# Patient Record
Sex: FEMALE | Race: WHITE | Employment: FULL TIME | URBAN - METROPOLITAN AREA
[De-identification: names, ages, dates, MRNs, and addresses within clinical notes are randomized per-mention and may not be internally consistent; named-entity substitution may affect disease eponyms.]

---

## 2018-08-14 PROBLEM — R80.9 MICROALBUMINURIA: Status: ACTIVE | Noted: 2018-08-14

## 2019-08-21 ENCOUNTER — HOSPITAL ENCOUNTER (EMERGENCY)
Age: 49
Discharge: HOME | End: 2019-08-21
Attending: EMERGENCY MEDICINE
Payer: MEDICARE

## 2019-08-21 VITALS
OXYGEN SATURATION: 97 % | HEART RATE: 89 BPM | WEIGHT: 265 LBS | HEIGHT: 70 IN | BODY MASS INDEX: 37.94 KG/M2 | TEMPERATURE: 97.9 F | SYSTOLIC BLOOD PRESSURE: 137 MMHG | DIASTOLIC BLOOD PRESSURE: 76 MMHG | RESPIRATION RATE: 18 BRPM

## 2019-08-21 DIAGNOSIS — R73.9 HYPERGLYCEMIA: Primary | ICD-10-CM

## 2019-08-21 DIAGNOSIS — R53.83 OTHER FATIGUE: ICD-10-CM

## 2019-08-21 LAB
-: ABNORMAL
ABSOLUTE EOS #: 0.1 K/UL (ref 0–0.4)
ABSOLUTE IMMATURE GRANULOCYTE: ABNORMAL K/UL (ref 0–0.3)
ABSOLUTE LYMPH #: 1.9 K/UL (ref 1–4.8)
ABSOLUTE MONO #: 0.35 K/UL (ref 0.1–1.3)
ALBUMIN SERPL-MCNC: 4.4 G/DL (ref 3.5–5.2)
ALBUMIN/GLOBULIN RATIO: ABNORMAL (ref 1–2.5)
ALP BLD-CCNC: 122 U/L (ref 35–104)
ALT SERPL-CCNC: 37 U/L (ref 5–33)
AMORPHOUS: ABNORMAL
ANION GAP SERPL CALCULATED.3IONS-SCNC: 16 MMOL/L (ref 9–17)
AST SERPL-CCNC: 43 U/L
BACTERIA: ABNORMAL
BASOPHILS # BLD: 1 % (ref 0–2)
BASOPHILS ABSOLUTE: 0.05 K/UL (ref 0–0.2)
BETA-HYDROXYBUTYRATE: 0.17 MMOL/L (ref 0.02–0.27)
BILIRUB SERPL-MCNC: 0.39 MG/DL (ref 0.3–1.2)
BILIRUBIN URINE: NEGATIVE
BUN BLDV-MCNC: 14 MG/DL (ref 6–20)
BUN/CREAT BLD: ABNORMAL (ref 9–20)
CALCIUM SERPL-MCNC: 9.6 MG/DL (ref 8.6–10.4)
CASTS UA: ABNORMAL /LPF
CASTS UA: ABNORMAL /LPF
CHLORIDE BLD-SCNC: 96 MMOL/L (ref 98–107)
CO2: 22 MMOL/L (ref 20–31)
COLOR: YELLOW
COMMENT UA: ABNORMAL
CREAT SERPL-MCNC: 0.55 MG/DL (ref 0.5–0.9)
CRYSTALS, UA: ABNORMAL /HPF
DIFFERENTIAL TYPE: ABNORMAL
EOSINOPHILS RELATIVE PERCENT: 2 % (ref 0–4)
EPITHELIAL CELLS UA: ABNORMAL /HPF
GFR AFRICAN AMERICAN: >60 ML/MIN
GFR NON-AFRICAN AMERICAN: >60 ML/MIN
GFR SERPL CREATININE-BSD FRML MDRD: ABNORMAL ML/MIN/{1.73_M2}
GFR SERPL CREATININE-BSD FRML MDRD: ABNORMAL ML/MIN/{1.73_M2}
GLUCOSE BLD-MCNC: 276 MG/DL (ref 70–99)
GLUCOSE URINE: ABNORMAL
HCT VFR BLD CALC: 37 % (ref 36–46)
HEMOGLOBIN: 11.8 G/DL (ref 12–16)
IMMATURE GRANULOCYTES: ABNORMAL %
KETONES, URINE: NEGATIVE
LEUKOCYTE ESTERASE, URINE: NEGATIVE
LYMPHOCYTES # BLD: 38 % (ref 24–44)
MCH RBC QN AUTO: 25.9 PG (ref 26–34)
MCHC RBC AUTO-ENTMCNC: 31.9 G/DL (ref 31–37)
MCV RBC AUTO: 81.3 FL (ref 80–100)
MONOCYTES # BLD: 7 % (ref 1–7)
MORPHOLOGY: ABNORMAL
MUCUS: ABNORMAL
NITRITE, URINE: NEGATIVE
NRBC AUTOMATED: ABNORMAL PER 100 WBC
OTHER OBSERVATIONS UA: ABNORMAL
PDW BLD-RTO: 16.4 % (ref 11.5–14.9)
PH UA: 5 (ref 5–8)
PLATELET # BLD: 210 K/UL (ref 150–450)
PLATELET ESTIMATE: ABNORMAL
PMV BLD AUTO: 8.3 FL (ref 6–12)
POTASSIUM SERPL-SCNC: 4.3 MMOL/L (ref 3.7–5.3)
PROTEIN UA: ABNORMAL
RBC # BLD: 4.55 M/UL (ref 4–5.2)
RBC # BLD: ABNORMAL 10*6/UL
RBC UA: ABNORMAL /HPF
RENAL EPITHELIAL, UA: ABNORMAL /HPF
SEG NEUTROPHILS: 52 % (ref 36–66)
SEGMENTED NEUTROPHILS ABSOLUTE COUNT: 2.6 K/UL (ref 1.3–9.1)
SODIUM BLD-SCNC: 134 MMOL/L (ref 135–144)
SPECIFIC GRAVITY UA: 1.02 (ref 1–1.03)
TOTAL PROTEIN: 8.3 G/DL (ref 6.4–8.3)
TRICHOMONAS: ABNORMAL
TSH SERPL DL<=0.05 MIU/L-ACNC: 1.67 MIU/L (ref 0.3–5)
TURBIDITY: ABNORMAL
URINE HGB: ABNORMAL
UROBILINOGEN, URINE: NORMAL
WBC # BLD: 5 K/UL (ref 3.5–11)
WBC # BLD: ABNORMAL 10*3/UL
WBC UA: ABNORMAL /HPF
YEAST: ABNORMAL

## 2019-08-21 PROCEDURE — 80053 COMPREHEN METABOLIC PANEL: CPT

## 2019-08-21 PROCEDURE — 84443 ASSAY THYROID STIM HORMONE: CPT

## 2019-08-21 PROCEDURE — 85025 COMPLETE CBC W/AUTO DIFF WBC: CPT

## 2019-08-21 PROCEDURE — 99283 EMERGENCY DEPT VISIT LOW MDM: CPT

## 2019-08-21 PROCEDURE — 82010 KETONE BODYS QUAN: CPT

## 2019-08-21 PROCEDURE — 81001 URINALYSIS AUTO W/SCOPE: CPT

## 2019-08-21 NOTE — ED PROVIDER NOTES
97.9 °F (36.6 °C) (Oral)   Resp 18   Ht 5' 10\" (1.778 m)   Wt 265 lb (120.2 kg)   LMP 07/20/2019   SpO2 97%   BMI 38.02 kg/m²     General Appearance: Well-appearing, in no acute distress  HEENT: Head: normocephalic/atraumatic eyes: PERRLA, EOMT, conjunctiva not injected, sclerae nonicteric ears: External canals patent nose: Nares patent, no rhinorrhea, throat:mucous membranes moist, oropharynx clear     Neck: Trachea midline, no JVD. Lungs: No evidence of increased work of breathing. CTA B/L, no wheezes/rhonchi     Cardiovascular: RRR, no murmur, 2+ peripheral pulses bilaterally. Cap refill less than 2 seconds. No lower extremity edema noted    Abdomen: Soft, nontender. No guarding or rebound tenderness. Neurologic: BRYANT  to person, place, time, and event. No sensation deficits. Moving all extremities    Extremities: Skin warm, dry and intact. DIFFERENTIAL  DIAGNOSIS     PLAN (LABS / IMAGING / EKG):  Orders Placed This Encounter   Procedures    CBC Auto Differential    Comprehensive Metabolic Panel    Urinalysis with Microscopic    Beta Hydroxybutyrate    TSH w/reflex to FT4    Inpatient consult to Primary Care Provider       MEDICATIONS ORDERED:  No orders of the defined types were placed in this encounter.       DIAGNOSTIC RESULTS / EMERGENCY DEPARTMENT COURSE / MDM     LABS:  Results for orders placed or performed during the hospital encounter of 08/21/19   CBC Auto Differential   Result Value Ref Range    WBC 5.0 3.5 - 11.0 k/uL    RBC 4.55 4.0 - 5.2 m/uL    Hemoglobin 11.8 (L) 12.0 - 16.0 g/dL    Hematocrit 37.0 36 - 46 %    MCV 81.3 80 - 100 fL    MCH 25.9 (L) 26 - 34 pg    MCHC 31.9 31 - 37 g/dL    RDW 16.4 (H) 11.5 - 14.9 %    Platelets 521 966 - 615 k/uL    MPV 8.3 6.0 - 12.0 fL    NRBC Automated NOT REPORTED per 100 WBC    Differential Type NOT REPORTED     Immature Granulocytes NOT REPORTED 0 %    Absolute Immature Granulocyte NOT REPORTED 0.00 - 0.30 k/uL    WBC Morphology None    Mucus, UA NOT REPORTED None    Trichomonas, UA NOT REPORTED None    Amorphous, UA NOT REPORTED None    Other Observations UA NOT REPORTED NOT REQ. Yeast, UA NOT REPORTED None   TSH w/reflex to FT4   Result Value Ref Range    TSH 1.67 0.30 - 5.00 mIU/L     RADIOLOGY:  No results found. EKG  None    All EKG's are interpreted by the Emergency Department Physician who either signs or Co-signs this chart in the absence of a cardiologist.    EMERGENCY DEPARTMENT COURSE/IMPRESSION:   Patient sent from primary care office for hyperglycemia to rule out DKA and for chronic fatigue. Will get basic labs beta hydroxybutyrate TSH as well as urinalysis. Will call primary care office with results and instructions for further care. Hyperglycemic but no evidence of DKA without ketones and urinating without decreased bicarbonate. Spoke with primary care physician's office covering call who did not have any specific recommendations as per patient follow-up with PCP and possibly endocrinology. PROCEDURES:  None    CONSULTS:  IP CONSULT TO PRIMARY CARE PROVIDER    CRITICAL CARE:  None    FINAL IMPRESSION      1. Hyperglycemia    2.  Other fatigue          DISPOSITION / PLAN     DISPOSITION        PATIENT REFERRED TO:  Knenedy Mendoza MD  [facility] 1995  [phone]            DISCHARGE MEDICATIONS:  New Prescriptions    No medications on file       Ashlee Pino MD  Emergency Medicine Resident    (Please note that portions of this note were completed with a voice recognition program.  Efforts were made to edit the dictations but occasionally words aremis-transcribed.)       Ashlee Pino MD  Resident  08/21/19 [id number]

## 2020-01-07 PROBLEM — K05.219 PERIODONTAL ABSCESS: Status: ACTIVE | Noted: 2017-10-03

## 2020-07-16 RX ORDER — PEN NEEDLE, DIABETIC 32GX 5/32"
NEEDLE, DISPOSABLE MISCELLANEOUS
Qty: 100 EACH | Refills: 0 | OUTPATIENT
Start: 2020-07-16

## 2022-10-31 ENCOUNTER — HOSPITAL ENCOUNTER (OUTPATIENT)
Age: 52
End: 2022-10-31
Attending: SURGERY
Payer: MEDICARE

## 2022-10-31 ENCOUNTER — HOSPITAL ENCOUNTER (OUTPATIENT)
Age: 52
Setting detail: SPECIMEN
Discharge: HOME | End: 2022-10-31
Payer: MEDICARE

## 2022-10-31 ENCOUNTER — HOSPITAL ENCOUNTER (OUTPATIENT)
Age: 52
Discharge: HOME | End: 2022-10-31
Attending: SURGERY
Payer: MEDICARE

## 2022-10-31 ENCOUNTER — HOSPITAL ENCOUNTER (OUTPATIENT)
Dept: PREADMISSION TESTING | Age: 52
Discharge: HOME | End: 2022-11-04
Attending: SURGERY | Admitting: SURGERY
Payer: MEDICARE

## 2022-10-31 VITALS
OXYGEN SATURATION: 99 % | TEMPERATURE: 98.3 F | DIASTOLIC BLOOD PRESSURE: 83 MMHG | HEIGHT: 70 IN | SYSTOLIC BLOOD PRESSURE: 149 MMHG | BODY MASS INDEX: 35.36 KG/M2 | WEIGHT: 247 LBS | HEART RATE: 85 BPM | RESPIRATION RATE: 16 BRPM

## 2022-10-31 LAB
ABO/RH: NORMAL
ABSOLUTE EOS #: 0 K/UL (ref 0–0.4)
ABSOLUTE LYMPH #: 2.4 K/UL (ref 1–4.8)
ABSOLUTE MONO #: 0.4 K/UL (ref 0.1–1.3)
ALBUMIN SERPL-MCNC: 4.4 G/DL (ref 3.5–5.2)
ALP BLD-CCNC: 180 U/L (ref 35–104)
ALT SERPL-CCNC: 17 U/L (ref 5–33)
ANION GAP SERPL CALCULATED.3IONS-SCNC: 11 MMOL/L (ref 9–17)
ANTIBODY SCREEN: NEGATIVE
ARM BAND NUMBER: NORMAL
AST SERPL-CCNC: 20 U/L
BASOPHILS # BLD: 1 % (ref 0–2)
BASOPHILS ABSOLUTE: 0 K/UL (ref 0–0.2)
BILIRUB SERPL-MCNC: 0.3 MG/DL (ref 0.3–1.2)
BLOOD BANK COMMENT: NORMAL
BUN BLDV-MCNC: 11 MG/DL (ref 6–20)
CALCIUM SERPL-MCNC: 9.9 MG/DL (ref 8.6–10.4)
CHLORIDE BLD-SCNC: 101 MMOL/L (ref 98–107)
CO2: 27 MMOL/L (ref 20–31)
CREAT SERPL-MCNC: 0.49 MG/DL (ref 0.5–0.9)
EOSINOPHILS RELATIVE PERCENT: 1 % (ref 0–4)
EXPIRATION DATE: NORMAL
GFR SERPL CREATININE-BSD FRML MDRD: >60 ML/MIN/1.73M2
GLUCOSE BLD-MCNC: 119 MG/DL (ref 70–99)
HCT VFR BLD CALC: 33.6 % (ref 36–46)
HEMOGLOBIN: 10.9 G/DL (ref 12–16)
LYMPHOCYTES # BLD: 34 % (ref 24–44)
MCH RBC QN AUTO: 26.3 PG (ref 26–34)
MCHC RBC AUTO-ENTMCNC: 32.4 G/DL (ref 31–37)
MCV RBC AUTO: 81.1 FL (ref 80–100)
MONOCYTES # BLD: 6 % (ref 1–7)
PDW BLD-RTO: 15.4 % (ref 11.5–14.9)
PLATELET # BLD: 267 K/UL (ref 150–450)
PMV BLD AUTO: 7.7 FL (ref 6–12)
POTASSIUM SERPL-SCNC: 4.9 MMOL/L (ref 3.7–5.3)
RBC # BLD: 4.14 M/UL (ref 4–5.2)
SEG NEUTROPHILS: 58 % (ref 36–66)
SEGMENTED NEUTROPHILS ABSOLUTE COUNT: 4.1 K/UL (ref 1.3–9.1)
SODIUM BLD-SCNC: 139 MMOL/L (ref 135–144)
TOTAL PROTEIN: 7.7 G/DL (ref 6.4–8.3)
WBC # BLD: 7 K/UL (ref 3.5–11)

## 2022-10-31 PROCEDURE — 85025 COMPLETE CBC W/AUTO DIFF WBC: CPT

## 2022-10-31 PROCEDURE — APPSS45 APP SPLIT SHARED TIME 31-45 MINUTES: Performed by: NURSE PRACTITIONER

## 2022-10-31 PROCEDURE — 83036 HEMOGLOBIN GLYCOSYLATED A1C: CPT

## 2022-10-31 PROCEDURE — 86901 BLOOD TYPING SEROLOGIC RH(D): CPT

## 2022-10-31 PROCEDURE — 86850 RBC ANTIBODY SCREEN: CPT

## 2022-10-31 PROCEDURE — 86900 BLOOD TYPING SEROLOGIC ABO: CPT

## 2022-10-31 PROCEDURE — 80053 COMPREHEN METABOLIC PANEL: CPT

## 2022-10-31 PROCEDURE — 36415 COLL VENOUS BLD VENIPUNCTURE: CPT

## 2022-10-31 NOTE — DISCHARGE INSTRUCTIONS
Pre-op Instructions For Patient Being Admitted After Surgery    Medication Instructions:  Please stop herbs and any supplements now (includes vitamins and minerals). Please contact your surgeon and prescribing physician for pre-op instructions for any blood thinners. If you have inhalers/aerosol treatments at home, please use them the morning of your surgery and bring the inhalers with you to the hospital.    Please take the following medications the morning of your surgery with a sip of water:    Metoprolol    Surgery Instructions:  After midnight before surgery:  Do not eat or drink anything, including water, mints, gum, and hard candy. You may brush your teeth without swallowing. No smoking, chewing tobacco, or street drugs. Please shower or bathe before surgery. If you were given Surgical Scrub Chlorhexidine Gluconate Liquid (CHG), please shower the night before and the morning of your surgery following the detailed instructions you received during your pre-admission visit. Please do not wear any cologne, lotion, powder, deodorant, jewelry, piercings, perfume, makeup, nail polish, hair accessories, or hair spray on the day of surgery. Wear loose comfortable clothing. Leave your valuables at home. Bring a storage case for any glasses/contacts. The Day of Surgery:  Arrive at [address] [place] [address] Surgery Entrance at the time directed by your surgeon and check in at the desk. If you have a living will or healthcare power of , please bring a copy. You will be taken to the pre-op holding area where you will be prepared for surgery. A physical assessment will be performed by a nurse practitioner or house officer. Your IV will be started and you will meet your anesthesiologist.    When you go to surgery, your family will be directed to the surgical waiting room, where the doctor should speak with them after your surgery.     You will be in the recovery room after surgery and taken to your room when you are stable. Please leave your suitcase in the car. Have your family member take it to your room after you are out of recovery. If you use a Bi-PAP or C-PAP machine, please bring it with you and leave it in the car until you are in your room.

## 2022-10-31 NOTE — H&P (VIEW-ONLY)
HISTORY and [facility] [address]       NAME:  Korin Luong  MRN: [id number]   YOB: 1970   Date: 10/31/2022   Age: 54 y.o. Gender: female       COMPLAINT AND PRESENT HISTORY:     Korin Luong is 54 y.o. , female, Preadmission Testing for SIGMOID DIVERTICULOSIS, MALIGNANT APPEARING MASS. She will be scheduled to have, OPEN SIGMOID COLECTOMY. Patient has hx of sigmoid diverticulitis, confirmed by CT scan on 8/8/22  She had flare up of abdominal pain lasting for months. The pain was  reported in her lower abdomen with some associated diarrhea. Approx 4 mos ago. Also pain worst with eating. She was eating bland diet on most part. Sporadic nausea and vomiting. Patient reports aggravating pain that would increase to 8/10 often. Patient reports that she has present pain in mid lower abdomen, she describes like \"menstrual cramp x 10\" she states when she eat something , she will have pain approx 15 min afterward. Pt states that she generally has the urge to defecate, but sometimes don\"t. She admits to more diarrhea with mucous and watery stools. , she denies any hematochezia or melena stools. Patient reports having loss of appetite and has had some weight loss. Her last Colonoscopy was 10/6/22 at [facility] . This was her first Colonoscopy   reports that found to have multiple large polyps. Patient reports discovering incidental finding in her CT report revealing Cirrhotic morphology of liver,  Splenomegaly and  Cholelithiasis. Patient is adopted and her FH is unknown. Patient denies any nausea / vomiting. No constipation. No heartburn or dysphagia. no changes in the color of the stools.      Any significant  medical conditions:   DM, MVP, HTN, anemia    Associated medications: Glucophage, glimepiride, Lantus 84 U BID, Novolog 12U TID,  Lopressor, Hyzaar,   Lipitor  Any anticoagulants or blood thinners: No    Hemoglobin A1C   Date Value Ref Range Status   08/30/2022 10.6 % Final     BP Readings from Last 3 Encounters:   10/31/22 (!) 149/83   10/20/22 136/82   08/30/22 114/70     Pt denies any current chest pain, SOB. No recent URI, fever or chills. Functional Capacity per patient:              1. Patient is able to walk 2 city blocks on level ground without SOB. 2. Patient is able to climb 2 flights of stairs without SOB. Patient denies any personal or family problems with anesthesia. IMAGING  CT ABDOMEN AND PELVIS W CONT  10/10/22    IMPRESSION:   1. Bowel wall thickening and inflammatory changes in the sigmoid colon consistent with focal inflammatory bowel disease. This could be infectious in etiology. However, underlying neoplasm not excluded. 2.  Cirrhotic morphology of liver without focal hepatic lesion. Splenomegaly. 3.  Cholelithiasis without cholecystitis. Lab Results   Component Value Date/Time    [place] [id number] 10/31/2022 01:45 PM         PAST MEDICAL HISTORY     Past Medical History:   Diagnosis Date    Anemia     low iron    Cataract     Depression     Detached retina 2001    Right eye. No vision upper half. Diabetes mellitus due to underlying condition, uncontrolled, with diabetic neuropathy, without long-term current use of insulin 06/21/2016    Diverticulitis     Hyperlipidemia     Hypertension     Microalbuminuria 08/14/2018    MVP (mitral valve prolapse)     Obesity (BMI 35.0-39.9 without comorbidity) 07/01/2016    Partial old retinal detachment     Shingles 09/2016    right neck, no residual    Type II or unspecified type diabetes mellitus without mention of complication, not stated as uncontrolled        SURGICAL HISTORY       Past Surgical History:   Procedure Laterality Date    COLONOSCOPY W/ BIOPSIES  10/06/2022    WITH POLYPS; done at \[facility]\"" Right 01/01/1983    ankle    LIPOMA RESECTION Left 01/01/2009    LUQ       FAMILY HISTORY       Family History   Adopted:  Yes SOCIAL HISTORY       Social History     Socioeconomic History    Marital status: Single     Spouse name: None    Number of children: None    Years of education: None    Highest education level: None   Occupational History    Occupation:      Comment: Night shift   Tobacco Use    Smoking status: Never    Smokeless tobacco: Never   Substance and Sexual Activity    Alcohol use: No     Alcohol/week: 0.0 standard drinks    Drug use: No     Social Determinants of Health     Financial Resource Strain: Medium Risk    Difficulty of Paying Living Expenses: Somewhat hard   Food Insecurity: No Food Insecurity    Worried About Running Out of Food in the Last Year: Never true    Ran Out of Food in the Last Year: Never true       REVIEW OF SYSTEMS      No Known Allergies    Current Outpatient Medications on File Prior to Encounter   Medication Sig Dispense Refill    insulin glargine (LANTUS SOLOSTAR) 100 UNIT/ML injection pen INJECT 82 UNITS SUBCUTANEOUSLY 2 TIMES A DAY (Patient taking differently: INJECT 84 UNITS SUBCUTANEOUSLY 2 TIMES A DAY) 45 mL 0    atorvastatin (LIPITOR) 20 MG tablet TAKE 1 TABLET BY MOUTH EVERY DAY 30 tablet 0    sertraline (ZOLOFT) 100 MG tablet Take 1 tablet by mouth daily 90 tablet 0    metoprolol tartrate (LOPRESSOR) 25 MG tablet Take 0.5 tablets by mouth 2 times daily 30 tablet 1    metFORMIN (GLUCOPHAGE) 1000 MG tablet TAKE 1 TABLET BY MOUTH TWO TIMES A DAY WITH MEALS 60 tablet 0    rOPINIRole (REQUIP) 0.5 MG tablet Take one tab PO nightly 30 min before bed. 30 tablet 2    nystatin (MYCOSTATIN) 505479 UNIT/GM powder Apply topically 4 times daily. (Patient taking differently: as needed Apply topically 4 times daily. ) 15 g 2    losartan-hydroCHLOROthiazide (HYZAAR) 100-12.5 MG per tablet TAKE 1 TABLET BY MOUTH EVERY DAY (Patient taking differently: at bedtime TAKE 1 TABLET BY MOUTH EVERY DAY) 30 tablet 0    insulin aspart (NOVOLOG FLEXPEN) 100 UNIT/ML injection pen INJECT 12 UNITS SUBCUTANEOUSLY THREE TIMES A DAY BEFORE MEALS 15 mL 0    Lancets (ONETOUCH DELICA PLUS [id number]) MISC Test Blood sugar up to 4 times daily, as needed 150 each 5    Insulin Pen Needle (BD PEN NEEDLE EDWIN 2ND GEN) 32G X 4 MM MISC USE AS DIRECTED TO INJECT DAILY 150 each 5    glimepiride (AMARYL) 2 MG tablet TAKE 1 TABLET BY MOUTH TWO TIMES A DAY 60 tablet 1    blood glucose test strips (ONETOUCH ULTRA) strip USE TO TEST BLOOD SUGAR 4 TIMES A DAY AS NEEDED FOR SYMPTOMS OF IRREGULAR BLOOD SUGAR 100 each 2    glucose monitoring kit (FREESTYLE) monitoring kit 1 kit by Does not apply route daily as needed (diabetes) One Touch Kit 1 kit 0     No current facility-administered medications on file prior to encounter. General health:  Fairly good. No fever or chills. Skin:  No itching, redness or rash. HEENT:  No headache, epistaxis or sore throat. Neck:  No pain, stiffness or masses. Cardiovascular/Respiratory system:  No chest pain, palpitation or shortness of breath. Gastrointestinal tract: See HPI. Genitourinary:  No burning on micturition. No hesitancy, urgency, frequency or discoloration of urine. Locomotor:  No bone or joint pains. No swelling. Neuropsychiatric:  No referable complaints. GENERAL PHYSICAL EXAM:     Vitals: BP (!) 149/83   Pulse 85   Temp 98.3 °F (36.8 °C)   Resp 16   Ht 5' 9.5\" (1.765 m)   Wt 247 lb (112 kg)   LMP 12/25/2021   SpO2 99%   BMI 35.95 kg/m²  Body mass index is 35.95 kg/m². GENERAL APPEARANCE:   Angela Gutierrez is 54 y.o., female, moderately obese, nourished, conscious, alert. Does not appear to be distress or pain at this time. SKIN:  Warm, dry, no cyanosis or jaundice. HEAD:  Normocephalic, atraumatic, no swelling or tenderness.                  EYES:  Pupils equal, reactive to light.           EARS:  No discharge, no marked hearing loss. NOSE:  No rhinorrhea, epistaxis or septal deformity. THROAT:  Not congested. No ulceration bleeding or discharge. NECK:  No stiffness, trachea central.                  CHEST:  Symmetrical and equal on expansion. HEART:  RRR S1 > S2. No audible murmurs or gallops. LUNGS:  Equal on expansion, normal breath sounds. No adventitious sounds. ABDOMEN:  Obese. Soft on palpation. No localized tenderness. No guarding or rigidity. LYMPHATICS:  No palpable cervical lymphadenopathy. LOCOMOTOR, BACK AND SPINE:  No tenderness or deformities. EXTREMITIES:  Symmetrical, no pretibial edema. No calf tenderness. No discoloration or ulcerations. NEUROLOGIC:  The patient is conscious, alert, oriented,Cranial nerve II-XII intact, taste and smell were not examined. No apparent focal sensory or motor deficits.                                                                   LAB REVIEW            Lab Results   Component Value Date    WBC 7.0 10/31/2022    HGB 10.9 (L) 10/31/2022    HCT 33.6 (L) 10/31/2022    MCV 81.1 10/31/2022     10/31/2022     Lab Results   Component Value Date/Time     10/31/2022 01:45 PM    K 4.9 10/31/2022 01:45 PM     10/31/2022 01:45 PM    CO2 27 10/31/2022 01:45 PM    BUN 11 10/31/2022 01:45 PM    CREATININE 0.49 10/31/2022 01:45 PM    GLUCOSE 119 10/31/2022 01:45 PM    CALCIUM 9.9 10/31/2022 01:45 PM                                            EKG REVIEW         Last EKG was on 9/12/22 reading Sinus Rhythm                PROVISIONAL DIAGNOSES / SURGERY:      OPEN SIGMOID COLECTOMY    SIGMOID DIVERTICULOSIS, MALIGNANT APPEARING MASS      Patient Active Problem List    Diagnosis Date Noted    Microalbuminuria 08/14/2018    Periodontal abscess 10/03/2017    Obesity (BMI 35.0-39.9 without comorbidity) 07/01/2016    Osteoarthritis of right knee 06/21/2016    Diabetes mellitus due to underlying condition, uncontrolled, with diabetic neuropathy, without long-term current use of insulin 06/21/2016    Hypertension 03/02/2015    Hyperlipemia 09/29/2014       CLEARANCE:     Pt has medical clearance from her PCP. Seen in office on 10/20/22. Abnormal labs sent to pt's PCP.      DANNA VILLASENOR, SINDY - CNP on 10/31/2022 at 2:52 PM    Total time spent on encounter- PAT provider minutes: 31-40 minutes

## 2022-10-31 NOTE — H&P
HISTORY and Treelain[facility] [id number]       NAME:  Mariely Osorio  MRN: [id number]   YOB: 1970   Date: 10/31/2022   Age: 54 y.o. Gender: female       COMPLAINT AND PRESENT HISTORY:     Mariely Osorio is 54 y.o. , female, Preadmission Testing for SIGMOID DIVERTICULOSIS, MALIGNANT APPEARING MASS. She will be scheduled to have, OPEN SIGMOID COLECTOMY. Patient has hx of sigmoid diverticulitis, confirmed by CT scan on 8/8/22  She had flare up of abdominal pain lasting for months. The pain was  reported in her lower abdomen with some associated diarrhea. Approx 4 mos ago. Also pain worst with eating. She was eating bland diet on most part. Sporadic nausea and vomiting. Patient reports aggravating pain that would increase to 8/10 often. Patient reports that she has present pain in mid lower abdomen, she describes like \"menstrual cramp x 10\" she states when she eat something , she will have pain approx 15 min afterward. Pt states that she generally has the urge to defecate, but sometimes don\"t. She admits to more diarrhea with mucous and watery stools. , she denies any hematochezia or melena stools. Patient reports having loss of appetite and has had some weight loss. Her last Colonoscopy was 10/6/22 at [facility] . This was her first Colonoscopy   reports that found to have multiple large polyps. Patient reports discovering incidental finding in her CT report revealing Cirrhotic morphology of liver,  Splenomegaly and  Cholelithiasis. Patient is adopted and her FH is unknown. Patient denies any nausea / vomiting. No constipation. No heartburn or dysphagia. no changes in the color of the stools.      Any significant  medical conditions:   DM, MVP, HTN, anemia    Associated medications: Glucophage, glimepiride, Lantus 84 U BID, Novolog 12U TID,  Lopressor, Hyzaar,   Lipitor  Any anticoagulants or blood thinners: No    Hemoglobin A1C   Date Value Ref Range Status   08/30/2022 10.6 % Final     BP Readings from Last 3 Encounters:   10/31/22 (!) 149/83   10/20/22 136/82   08/30/22 114/70     Pt denies any current chest pain, SOB. No recent URI, fever or chills. Functional Capacity per patient:              1. Patient is able to walk 2 city blocks on level ground without SOB. 2. Patient is able to climb 2 flights of stairs without SOB. Patient denies any personal or family problems with anesthesia. IMAGING  CT ABDOMEN AND PELVIS W CONT  10/10/22    IMPRESSION:   1. Bowel wall thickening and inflammatory changes in the sigmoid colon consistent with focal inflammatory bowel disease. This could be infectious in etiology. However, underlying neoplasm not excluded. 2.  Cirrhotic morphology of liver without focal hepatic lesion. Splenomegaly. 3.  Cholelithiasis without cholecystitis. Lab Results   Component Value Date/Time    [place] [address] 10/31/2022 01:45 PM         PAST MEDICAL HISTORY     Past Medical History:   Diagnosis Date    Anemia     low iron    Cataract     Depression     Detached retina 2001    Right eye. No vision upper half. Diabetes mellitus due to underlying condition, uncontrolled, with diabetic neuropathy, without long-term current use of insulin 06/21/2016    Diverticulitis     Hyperlipidemia     Hypertension     Microalbuminuria 08/14/2018    MVP (mitral valve prolapse)     Obesity (BMI 35.0-39.9 without comorbidity) 07/01/2016    Partial old retinal detachment     Shingles 09/2016    right neck, no residual    Type II or unspecified type diabetes mellitus without mention of complication, not stated as uncontrolled        SURGICAL HISTORY       Past Surgical History:   Procedure Laterality Date    COLONOSCOPY W/ BIOPSIES  10/06/2022    WITH POLYPS; done at [facility] Right 01/01/1983    ankle    LIPOMA RESECTION Left 01/01/2009    LUQ       FAMILY HISTORY       Family History   Adopted:  Yes SOCIAL HISTORY       Social History     Socioeconomic History    Marital status: Single     Spouse name: None    Number of children: None    Years of education: None    Highest education level: None   Occupational History    Occupation:      Comment: Night shift   Tobacco Use    Smoking status: Never    Smokeless tobacco: Never   Substance and Sexual Activity    Alcohol use: No     Alcohol/week: 0.0 standard drinks    Drug use: No     Social Determinants of Health     Financial Resource Strain: Medium Risk    Difficulty of Paying Living Expenses: Somewhat hard   Food Insecurity: No Food Insecurity    Worried About Running Out of Food in the Last Year: Never true    Ran Out of Food in the Last Year: Never true       REVIEW OF SYSTEMS      No Known Allergies    Current Outpatient Medications on File Prior to Encounter   Medication Sig Dispense Refill    insulin glargine (LANTUS SOLOSTAR) 100 UNIT/ML injection pen INJECT 82 UNITS SUBCUTANEOUSLY 2 TIMES A DAY (Patient taking differently: INJECT 84 UNITS SUBCUTANEOUSLY 2 TIMES A DAY) 45 mL 0    atorvastatin (LIPITOR) 20 MG tablet TAKE 1 TABLET BY MOUTH EVERY DAY 30 tablet 0    sertraline (ZOLOFT) 100 MG tablet Take 1 tablet by mouth daily 90 tablet 0    metoprolol tartrate (LOPRESSOR) 25 MG tablet Take 0.5 tablets by mouth 2 times daily 30 tablet 1    metFORMIN (GLUCOPHAGE) 1000 MG tablet TAKE 1 TABLET BY MOUTH TWO TIMES A DAY WITH MEALS 60 tablet 0    rOPINIRole (REQUIP) 0.5 MG tablet Take one tab PO nightly 30 min before bed. 30 tablet 2    nystatin (MYCOSTATIN) 909914 UNIT/GM powder Apply topically 4 times daily. (Patient taking differently: as needed Apply topically 4 times daily. ) 15 g 2    losartan-hydroCHLOROthiazide (HYZAAR) 100-12.5 MG per tablet TAKE 1 TABLET BY MOUTH EVERY DAY (Patient taking differently: at bedtime TAKE 1 TABLET BY MOUTH EVERY DAY) 30 tablet 0    insulin aspart (NOVOLOG FLEXPEN) 100 UNIT/ML injection pen INJECT 12 UNITS SUBCUTANEOUSLY THREE TIMES A DAY BEFORE MEALS 15 mL 0    Lancets (ONETOUCH DELICA PLUS [id number]) MISC Test Blood sugar up to 4 times daily, as needed 150 each 5    Insulin Pen Needle (BD PEN NEEDLE EDWIN 2ND GEN) 32G X 4 MM MISC USE AS DIRECTED TO INJECT DAILY 150 each 5    glimepiride (AMARYL) 2 MG tablet TAKE 1 TABLET BY MOUTH TWO TIMES A DAY 60 tablet 1    blood glucose test strips (ONETOUCH ULTRA) strip USE TO TEST BLOOD SUGAR 4 TIMES A DAY AS NEEDED FOR SYMPTOMS OF IRREGULAR BLOOD SUGAR 100 each 2    glucose monitoring kit (FREESTYLE) monitoring kit 1 kit by Does not apply route daily as needed (diabetes) One Touch Kit 1 kit 0     No current facility-administered medications on file prior to encounter. General health:  Fairly good. No fever or chills. Skin:  No itching, redness or rash. HEENT:  No headache, epistaxis or sore throat. Neck:  No pain, stiffness or masses. Cardiovascular/Respiratory system:  No chest pain, palpitation or shortness of breath. Gastrointestinal tract: See HPI. Genitourinary:  No burning on micturition. No hesitancy, urgency, frequency or discoloration of urine. Locomotor:  No bone or joint pains. No swelling. Neuropsychiatric:  No referable complaints. GENERAL PHYSICAL EXAM:     Vitals: BP (!) 149/83   Pulse 85   Temp 98.3 °F (36.8 °C)   Resp 16   Ht 5' 9.5\" (1.765 m)   Wt 247 lb (112 kg)   LMP 12/25/2021   SpO2 99%   BMI 35.95 kg/m²  Body mass index is 35.95 kg/m². GENERAL APPEARANCE:   Emanuel Gruber is 54 y.o., female, moderately obese, nourished, conscious, alert. Does not appear to be distress or pain at this time. SKIN:  Warm, dry, no cyanosis or jaundice. HEAD:  Normocephalic, atraumatic, no swelling or tenderness.                  EYES:  Pupils equal, reactive to light.           EARS:  No discharge, no marked hearing loss. NOSE:  No rhinorrhea, epistaxis or septal deformity. THROAT:  Not congested. No ulceration bleeding or discharge. NECK:  No stiffness, trachea central.                  CHEST:  Symmetrical and equal on expansion. HEART:  RRR S1 > S2. No audible murmurs or gallops. LUNGS:  Equal on expansion, normal breath sounds. No adventitious sounds. ABDOMEN:  Obese. Soft on palpation. No localized tenderness. No guarding or rigidity. LYMPHATICS:  No palpable cervical lymphadenopathy. LOCOMOTOR, BACK AND SPINE:  No tenderness or deformities. EXTREMITIES:  Symmetrical, no pretibial edema. No calf tenderness. No discoloration or ulcerations. NEUROLOGIC:  The patient is conscious, alert, oriented,Cranial nerve II-XII intact, taste and smell were not examined. No apparent focal sensory or motor deficits.                                                                   LAB REVIEW            Lab Results   Component Value Date    WBC 7.0 10/31/2022    HGB 10.9 (L) 10/31/2022    HCT 33.6 (L) 10/31/2022    MCV 81.1 10/31/2022     10/31/2022     Lab Results   Component Value Date/Time     10/31/2022 01:45 PM    K 4.9 10/31/2022 01:45 PM     10/31/2022 01:45 PM    CO2 27 10/31/2022 01:45 PM    BUN 11 10/31/2022 01:45 PM    CREATININE 0.49 10/31/2022 01:45 PM    GLUCOSE 119 10/31/2022 01:45 PM    CALCIUM 9.9 10/31/2022 01:45 PM                                            EKG REVIEW         Last EKG was on 9/12/22 reading Sinus Rhythm                PROVISIONAL DIAGNOSES / SURGERY:      OPEN SIGMOID COLECTOMY    SIGMOID DIVERTICULOSIS, MALIGNANT APPEARING MASS      Patient Active Problem List    Diagnosis Date Noted    Microalbuminuria 08/14/2018    Periodontal abscess 10/03/2017    Obesity (BMI 35.0-39.9 without comorbidity) 07/01/2016    Osteoarthritis of right knee 06/21/2016    Diabetes mellitus due to underlying condition, uncontrolled, with diabetic neuropathy, without long-term current use of insulin 06/21/2016    Hypertension 03/02/2015    Hyperlipemia 09/29/2014       CLEARANCE:     Pt has medical clearance from her PCP. Seen in office on 10/20/22. Abnormal labs sent to pt's PCP.      DANNA VILLASENOR, SINDY - CNP on 10/31/2022 at 2:52 PM    Total time spent on encounter- PAT provider minutes: 31-40 minutes

## 2022-11-01 LAB
ESTIMATED AVERAGE GLUCOSE: 209 MG/DL
HBA1C MFR BLD: 8.9 % (ref 4–6)

## 2022-11-10 ENCOUNTER — ANESTHESIA EVENT (OUTPATIENT)
Dept: OPERATING ROOM | Age: 52
End: 2022-11-10
Payer: MEDICARE

## 2022-11-10 NOTE — PRE-PROCEDURE INSTRUCTIONS
No answer, left message ? Unable to leave message ? When were you told to arrive at hospital ?  1100    Do you have a  ?yes    Are you on any blood thinners ? no                 If yes when did you stop taking ? Do you have your prep Rx filled and instruction ? yes    Nothing to eat the day before , only clear liquids. yes    Are you experiencing any covid symptoms ? no    Do you have any infections or rash we should be aware of ?no      Do you have the Hibiclens soap to use the night before and the morning of surgery ? yes    Nothing to eat or drink after midnight, only a sip of water to take any medication instructed to take the night before. yes  Wear comfortable clothing, leave any valuables at home, remove any jewelry and body piercing yes.

## 2022-11-11 ENCOUNTER — HOSPITAL ENCOUNTER (OUTPATIENT)
Age: 52
Setting detail: SURGERY ADMIT
Discharge: HOME | End: 2022-11-11
Attending: SURGERY | Admitting: SURGERY
Payer: MEDICARE

## 2022-11-11 ENCOUNTER — ANESTHESIA (OUTPATIENT)
Dept: OPERATING ROOM | Age: 52
End: 2022-11-11
Payer: MEDICARE

## 2022-11-11 VITALS
WEIGHT: 247 LBS | TEMPERATURE: 97.1 F | SYSTOLIC BLOOD PRESSURE: 121 MMHG | HEIGHT: 70 IN | DIASTOLIC BLOOD PRESSURE: 68 MMHG | RESPIRATION RATE: 12 BRPM | BODY MASS INDEX: 35.36 KG/M2 | OXYGEN SATURATION: 93 % | HEART RATE: 74 BPM

## 2022-11-11 DIAGNOSIS — K57.30 SIGMOID DIVERTICULOSIS: ICD-10-CM

## 2022-11-11 LAB
GLUCOSE BLD-MCNC: 139 MG/DL (ref 65–105)
HCG QUALITATIVE: NEGATIVE

## 2022-11-11 PROCEDURE — 7100000031 HC ASPR PHASE II RECOVERY - ADDTL 15 MIN: Performed by: SURGERY

## 2022-11-11 PROCEDURE — 3700000000 HC ANESTHESIA ATTENDED CARE: Performed by: SURGERY

## 2022-11-11 PROCEDURE — 2500000003 HC RX 250 WO HCPCS: Performed by: SURGERY

## 2022-11-11 PROCEDURE — 88342 IMHCHEM/IMCYTCHM 1ST ANTB: CPT

## 2022-11-11 PROCEDURE — 2500000003 HC RX 250 WO HCPCS: Performed by: NURSE ANESTHETIST, CERTIFIED REGISTERED

## 2022-11-11 PROCEDURE — 36415 COLL VENOUS BLD VENIPUNCTURE: CPT

## 2022-11-11 PROCEDURE — 7100000001 HC PACU RECOVERY - ADDTL 15 MIN: Performed by: SURGERY

## 2022-11-11 PROCEDURE — 88305 TISSUE EXAM BY PATHOLOGIST: CPT

## 2022-11-11 PROCEDURE — 88341 IMHCHEM/IMCYTCHM EA ADD ANTB: CPT

## 2022-11-11 PROCEDURE — 7100000011 HC PHASE II RECOVERY - ADDTL 15 MIN: Performed by: SURGERY

## 2022-11-11 PROCEDURE — 3600000002 HC SURGERY LEVEL 2 BASE: Performed by: SURGERY

## 2022-11-11 PROCEDURE — 82947 ASSAY GLUCOSE BLOOD QUANT: CPT

## 2022-11-11 PROCEDURE — 6360000002 HC RX W HCPCS: Performed by: SURGERY

## 2022-11-11 PROCEDURE — 6360000002 HC RX W HCPCS: Performed by: NURSE ANESTHETIST, CERTIFIED REGISTERED

## 2022-11-11 PROCEDURE — 2720000010 HC SURG SUPPLY STERILE: Performed by: SURGERY

## 2022-11-11 PROCEDURE — 2709999900 HC NON-CHARGEABLE SUPPLY: Performed by: SURGERY

## 2022-11-11 PROCEDURE — 2580000003 HC RX 258: Performed by: ANESTHESIOLOGY

## 2022-11-11 PROCEDURE — 2580000003 HC RX 258: Performed by: NURSE ANESTHETIST, CERTIFIED REGISTERED

## 2022-11-11 PROCEDURE — 7100000000 HC PACU RECOVERY - FIRST 15 MIN: Performed by: SURGERY

## 2022-11-11 PROCEDURE — 7100000010 HC PHASE II RECOVERY - FIRST 15 MIN: Performed by: SURGERY

## 2022-11-11 PROCEDURE — 3600000012 HC SURGERY LEVEL 2 ADDTL 15MIN: Performed by: SURGERY

## 2022-11-11 PROCEDURE — 6370000000 HC RX 637 (ALT 250 FOR IP): Performed by: ANESTHESIOLOGY

## 2022-11-11 PROCEDURE — 3700000001 HC ADD 15 MINUTES (ANESTHESIA): Performed by: SURGERY

## 2022-11-11 PROCEDURE — 7100000030 HC ASPR PHASE II RECOVERY - FIRST 15 MIN: Performed by: SURGERY

## 2022-11-11 PROCEDURE — 84703 CHORIONIC GONADOTROPIN ASSAY: CPT

## 2022-11-11 RX ORDER — SODIUM CHLORIDE 9 MG/ML
INJECTION, SOLUTION INTRAVENOUS CONTINUOUS
Status: DISCONTINUED | OUTPATIENT
Start: 2022-11-11 | End: 2022-11-11 | Stop reason: HOSPADM

## 2022-11-11 RX ORDER — SCOLOPAMINE TRANSDERMAL SYSTEM 1 MG/1
1 PATCH, EXTENDED RELEASE TRANSDERMAL
Status: DISCONTINUED | OUTPATIENT
Start: 2022-11-11 | End: 2022-11-11 | Stop reason: HOSPADM

## 2022-11-11 RX ORDER — ACETAMINOPHEN 500 MG
1000 TABLET ORAL ONCE
Status: COMPLETED | OUTPATIENT
Start: 2022-11-11 | End: 2022-11-11

## 2022-11-11 RX ORDER — DEXAMETHASONE SODIUM PHOSPHATE 4 MG/ML
INJECTION, SOLUTION INTRA-ARTICULAR; INTRALESIONAL; INTRAMUSCULAR; INTRAVENOUS; SOFT TISSUE PRN
Status: DISCONTINUED | OUTPATIENT
Start: 2022-11-11 | End: 2022-11-11 | Stop reason: SDUPTHER

## 2022-11-11 RX ORDER — METOCLOPRAMIDE HYDROCHLORIDE 5 MG/ML
10 INJECTION INTRAMUSCULAR; INTRAVENOUS
Status: DISCONTINUED | OUTPATIENT
Start: 2022-11-11 | End: 2022-11-11 | Stop reason: HOSPADM

## 2022-11-11 RX ORDER — SODIUM CHLORIDE 0.9 % (FLUSH) 0.9 %
5-40 SYRINGE (ML) INJECTION EVERY 12 HOURS SCHEDULED
Status: DISCONTINUED | OUTPATIENT
Start: 2022-11-11 | End: 2022-11-11 | Stop reason: HOSPADM

## 2022-11-11 RX ORDER — ROCURONIUM BROMIDE 10 MG/ML
INJECTION, SOLUTION INTRAVENOUS PRN
Status: DISCONTINUED | OUTPATIENT
Start: 2022-11-11 | End: 2022-11-11 | Stop reason: SDUPTHER

## 2022-11-11 RX ORDER — SODIUM CHLORIDE 9 MG/ML
INJECTION, SOLUTION INTRAVENOUS CONTINUOUS PRN
Status: DISCONTINUED | OUTPATIENT
Start: 2022-11-11 | End: 2022-11-11 | Stop reason: SDUPTHER

## 2022-11-11 RX ORDER — LIDOCAINE HYDROCHLORIDE 20 MG/ML
INJECTION, SOLUTION EPIDURAL; INFILTRATION; INTRACAUDAL; PERINEURAL PRN
Status: DISCONTINUED | OUTPATIENT
Start: 2022-11-11 | End: 2022-11-11 | Stop reason: SDUPTHER

## 2022-11-11 RX ORDER — FENTANYL CITRATE 0.05 MG/ML
25 INJECTION, SOLUTION INTRAMUSCULAR; INTRAVENOUS EVERY 5 MIN PRN
Status: DISCONTINUED | OUTPATIENT
Start: 2022-11-11 | End: 2022-11-11 | Stop reason: HOSPADM

## 2022-11-11 RX ORDER — DIPHENHYDRAMINE HYDROCHLORIDE 50 MG/ML
12.5 INJECTION INTRAMUSCULAR; INTRAVENOUS
Status: DISCONTINUED | OUTPATIENT
Start: 2022-11-11 | End: 2022-11-11 | Stop reason: HOSPADM

## 2022-11-11 RX ORDER — ONDANSETRON 2 MG/ML
INJECTION INTRAMUSCULAR; INTRAVENOUS PRN
Status: DISCONTINUED | OUTPATIENT
Start: 2022-11-11 | End: 2022-11-11 | Stop reason: SDUPTHER

## 2022-11-11 RX ORDER — SODIUM CHLORIDE 9 MG/ML
INJECTION, SOLUTION INTRAVENOUS PRN
Status: DISCONTINUED | OUTPATIENT
Start: 2022-11-11 | End: 2022-11-11 | Stop reason: HOSPADM

## 2022-11-11 RX ORDER — METRONIDAZOLE 500 MG/100ML
500 INJECTION, SOLUTION INTRAVENOUS ONCE
Status: COMPLETED | OUTPATIENT
Start: 2022-11-11 | End: 2022-11-11

## 2022-11-11 RX ORDER — SODIUM CHLORIDE 0.9 % (FLUSH) 0.9 %
5-40 SYRINGE (ML) INJECTION PRN
Status: DISCONTINUED | OUTPATIENT
Start: 2022-11-11 | End: 2022-11-11 | Stop reason: HOSPADM

## 2022-11-11 RX ORDER — PROPOFOL 10 MG/ML
INJECTION, EMULSION INTRAVENOUS PRN
Status: DISCONTINUED | OUTPATIENT
Start: 2022-11-11 | End: 2022-11-11 | Stop reason: SDUPTHER

## 2022-11-11 RX ORDER — LIDOCAINE HYDROCHLORIDE 10 MG/ML
1 INJECTION, SOLUTION EPIDURAL; INFILTRATION; INTRACAUDAL; PERINEURAL
Status: DISCONTINUED | OUTPATIENT
Start: 2022-11-11 | End: 2022-11-11 | Stop reason: HOSPADM

## 2022-11-11 RX ORDER — SODIUM CHLORIDE 9 MG/ML
25 INJECTION, SOLUTION INTRAVENOUS PRN
Status: DISCONTINUED | OUTPATIENT
Start: 2022-11-11 | End: 2022-11-11 | Stop reason: HOSPADM

## 2022-11-11 RX ORDER — GABAPENTIN 300 MG/1
300 CAPSULE ORAL ONCE
Status: COMPLETED | OUTPATIENT
Start: 2022-11-11 | End: 2022-11-11

## 2022-11-11 RX ORDER — HYDRALAZINE HYDROCHLORIDE 20 MG/ML
10 INJECTION INTRAMUSCULAR; INTRAVENOUS
Status: DISCONTINUED | OUTPATIENT
Start: 2022-11-11 | End: 2022-11-11 | Stop reason: HOSPADM

## 2022-11-11 RX ORDER — ONDANSETRON 2 MG/ML
4 INJECTION INTRAMUSCULAR; INTRAVENOUS
Status: DISCONTINUED | OUTPATIENT
Start: 2022-11-11 | End: 2022-11-11 | Stop reason: HOSPADM

## 2022-11-11 RX ORDER — LABETALOL HYDROCHLORIDE 5 MG/ML
10 INJECTION, SOLUTION INTRAVENOUS
Status: DISCONTINUED | OUTPATIENT
Start: 2022-11-11 | End: 2022-11-11 | Stop reason: HOSPADM

## 2022-11-11 RX ORDER — MIDAZOLAM HYDROCHLORIDE 1 MG/ML
INJECTION INTRAMUSCULAR; INTRAVENOUS PRN
Status: DISCONTINUED | OUTPATIENT
Start: 2022-11-11 | End: 2022-11-11 | Stop reason: SDUPTHER

## 2022-11-11 RX ADMIN — DEXAMETHASONE SODIUM PHOSPHATE 4 MG: 4 INJECTION, SOLUTION INTRAMUSCULAR; INTRAVENOUS at 13:37

## 2022-11-11 RX ADMIN — ONDANSETRON 4 MG: 2 INJECTION INTRAMUSCULAR; INTRAVENOUS at 13:37

## 2022-11-11 RX ADMIN — MIDAZOLAM 2 MG: 1 INJECTION INTRAMUSCULAR; INTRAVENOUS at 13:28

## 2022-11-11 RX ADMIN — ACETAMINOPHEN 1000 MG: 500 TABLET ORAL at 11:46

## 2022-11-11 RX ADMIN — PROPOFOL 160 MG: 10 INJECTION, EMULSION INTRAVENOUS at 13:37

## 2022-11-11 RX ADMIN — GABAPENTIN 300 MG: 300 CAPSULE ORAL at 11:46

## 2022-11-11 RX ADMIN — PHENYLEPHRINE HYDROCHLORIDE 100 MCG: 10 INJECTION INTRAVENOUS at 14:08

## 2022-11-11 RX ADMIN — SUGAMMADEX 200 MG: 100 INJECTION, SOLUTION INTRAVENOUS at 14:20

## 2022-11-11 RX ADMIN — PHENYLEPHRINE HYDROCHLORIDE 150 MCG: 10 INJECTION INTRAVENOUS at 14:16

## 2022-11-11 RX ADMIN — SODIUM CHLORIDE: 9 INJECTION, SOLUTION INTRAVENOUS at 11:59

## 2022-11-11 RX ADMIN — CEFAZOLIN 2000 MG: 10 INJECTION, POWDER, FOR SOLUTION INTRAVENOUS at 13:37

## 2022-11-11 RX ADMIN — SODIUM CHLORIDE: 9 INJECTION, SOLUTION INTRAVENOUS at 14:23

## 2022-11-11 RX ADMIN — LIDOCAINE HYDROCHLORIDE 40 MG: 20 INJECTION, SOLUTION EPIDURAL; INFILTRATION; INTRACAUDAL; PERINEURAL at 13:28

## 2022-11-11 RX ADMIN — ROCURONIUM BROMIDE 50 MG: 10 INJECTION, SOLUTION INTRAVENOUS at 13:45

## 2022-11-11 RX ADMIN — METRONIDAZOLE 500 MG: 500 INJECTION, SOLUTION INTRAVENOUS at 13:43

## 2022-11-11 RX ADMIN — GLUCAGON HYDROCHLORIDE 1 MG: KIT at 13:44

## 2022-11-11 ASSESSMENT — PAIN SCALES - GENERAL: PAINLEVEL_OUTOF10: 0

## 2022-11-11 ASSESSMENT — PAIN - FUNCTIONAL ASSESSMENT: PAIN_FUNCTIONAL_ASSESSMENT: NONE - DENIES PAIN

## 2022-11-11 NOTE — OP NOTE
Operative Note      Patient: Nessa Khanna  YOB: 1970  MRN: [id number]    Date of Procedure: 11/11/2022  PROCEDURE NOTE    DATE OF PROCEDURE: 11/11/2022    SURGEON: Xenia Bennett MD    ASSISTANT: None    PREOPERATIVE DIAGNOSIS: Large polyp at approximately 50 cm from anal verge    POSTOPERATIVE DIAGNOSIS: Same    OPERATION: Total colonoscopy colonoscopy to left colon. Hot snare polypectomy large polyp at 50 cm from anal verge with resolution clip applications x3 with complete hemostasis confirmed. ANESTHESIA: General    ESTIMATED BLOOD LOSS: None    COMPLICATIONS: None     SPECIMENS:  Was Obtained: Polyp at 50 cm    HISTORY: The patient is a 54y.o. year old female with history of above preop diagnosis. I recommended colonoscopy with possible biopsy or polypectomy and I explained the risk, benefits, expected outcome, and alternatives to the procedure. Risks included but are not limited to bleeding, infection, respiratory distress, hypotension, and perforation of the colon and possibility of missing a lesion. The patient understands and is in agreement. PROCEDURE: The patient was given IV conscious sedation. The patient's SPO2 remained above 90% throughout the procedure. Digital rectal exam was normal.  The colonoscope was inserted through the anus into the rectum and advanced under direct vision to the cecum without difficulty. Terminal ileum was examined for approximately 2 inches. The prep was fair. Findings:  Terminal ileum: not examined    Cecum/Ascending colon: not examined    Transverse colon: not examined    Descending/Sigmoid colon: abnormal: Large polyp at about 50 cm with a long thick stalk. Initially I placed resolution clips from either side of the stalk making sure that the vascular flow to the polyp is controlled with resolution clips. Satisfactory resolution clip application confirmed.   At this point a medium snare was placed around this polypoid mass towards the She ink to margin. We will wait for final histopathology which should be available early next week. Depending on the results I will make further recommendations. Patient is well aware that if this comes back malignant she may need further intervention including possible surgery.       Electronically signed by Amelia Rae MD  on 11/11/2022 at 2:25 PM

## 2022-11-11 NOTE — ANESTHESIA POSTPROCEDURE EVALUATION
Department of Anesthesiology  Postprocedure Note    Patient: Daniel Greenberg  MRN: [id number]  YOB: 1970  Date of evaluation: 11/11/2022      Procedure Summary     Date: 11/11/22 Room / Location: [address] [place] [address]    Anesthesia Start: 1331 Anesthesia Stop: 1435    Procedure: COLONOSCOPY POLYPECTOMY HOT W/ CLIP APPLICATION X3 Diagnosis:       Sigmoid diverticulosis      (SIGMOID DIVERTICULOSIS, MALIGNANT APPEARING MASS)    Surgeons: Beatriz Frausto MD Responsible Provider: Yashira Arredondo MD    Anesthesia Type: General ASA Status: 2          Anesthesia Type: General    Екатерина Phase I: Екатерина Score: 10    Екатерина Phase II:        Anesthesia Post Evaluation    Comments: POST- ANESTHESIA EVALUATION       Pt Name: Daniel Greenberg  MRN: [id number]  YOB: 1970  Date of evaluation: 11/11/2022  Time:  3:52 PM      /68   Pulse 74   Temp 97.1 °F (36.2 °C)   Resp 12   Ht 5' 9.5\" (1.765 m)   Wt 247 lb (112 kg)   LMP 12/10/2021   SpO2 93%   BMI 35.95 kg/m²      Consciousness Level  Awake  Cardiopulmonary Status  Stable  Pain Adequately Treated YES  Nausea / Vomiting  NO  Adequate Hydration  YES  Anesthesia Related Complications NONE      Electronically signed by Yashira Arredondo MD on 11/11/2022 at 3:52 PM

## 2022-11-11 NOTE — ANESTHESIA PRE PROCEDURE
Department of Anesthesiology  Preprocedure Note       Name:  Moustapha Burleson   Age:  54 y.o.  :  1970                                          MRN:  [id number]         Date:  2022      Surgeon: Ying Lin):  Harini Elkins MD    Procedure: Procedure(s):  OPEN SIGMOID COLECTOMY    Medications prior to admission:   Prior to Admission medications    Medication Sig Start Date End Date Taking? Authorizing Provider   insulin glargine (LANTUS SOLOSTAR) 100 UNIT/ML injection pen INJECT 82 UNITS SUBCUTANEOUSLY 2 TIMES A DAY  Patient taking differently: INJECT 84 UNITS SUBCUTANEOUSLY 2 TIMES A DAY 10/24/22   Shahana Cardona MD   atorvastatin (LIPITOR) 20 MG tablet TAKE 1 TABLET BY MOUTH EVERY DAY 10/20/22   Ernesto Chandler MD   sertraline (ZOLOFT) 100 MG tablet Take 1 tablet by mouth daily 10/20/22 1/18/23  Ernesto Chandler MD   metoprolol tartrate (LOPRESSOR) 25 MG tablet Take 0.5 tablets by mouth 2 times daily 10/20/22   Ernesto Chandler MD   metFORMIN (GLUCOPHAGE) 1000 MG tablet TAKE 1 TABLET BY MOUTH TWO TIMES A DAY WITH MEALS 22   JEANETH Harrison   rOPINIRole (REQUIP) 0.5 MG tablet Take one tab PO nightly 30 min before bed. 22   JEANETH Harrison   nystatin (MYCOSTATIN) 289545 UNIT/GM powder Apply topically 4 times daily. Patient taking differently: as needed Apply topically 4 times daily.  22   JEANETH Harrison   losartan-hydroCHLOROthiazide (HYZAAR) 100-12.5 MG per tablet TAKE 1 TABLET BY MOUTH EVERY DAY  Patient taking differently: at bedtime TAKE 1 TABLET BY MOUTH EVERY DAY 22   JEANETH Harrison   insulin aspart (NOVOLOG FLEXPEN) 100 UNIT/ML injection pen INJECT 12 UNITS SUBCUTANEOUSLY THREE TIMES A DAY BEFORE MEALS 22   JEANETH [address]   Lancets (ONETOUCH DELICA PLUS [id number]) MISC Test Blood sugar up to 4 times daily, as needed 22   JEANETH [address]ango   Insulin Pen Needle (BD PEN NEEDLE EDWIN 2ND GEN) 32G X 4 MM MISC USE AS DIRECTED TO INJECT DAILY 8/30/22   JEANETH Harrison   glimepiride (AMARYL) 2 MG tablet TAKE 1 TABLET BY MOUTH TWO TIMES A DAY 8/30/22   JEANETH Harrison   blood glucose test strips (ONETOUCH ULTRA) strip USE TO TEST BLOOD SUGAR 4 TIMES A DAY AS NEEDED FOR SYMPTOMS OF IRREGULAR BLOOD SUGAR 8/30/22   JEANETH Harrison   glucose monitoring kit (FREESTYLE) monitoring kit 1 kit by Does not apply route daily as needed (diabetes) One Touch Kit 7/11/17   Ovidio Jones PA-C       Current medications:    Current Facility-Administered Medications   Medication Dose Route Frequency Provider Last Rate Last Admin    ceFAZolin (ANCEF) 2000 mg in dextrose 5 % 50 mL IVPB  2,000 mg IntraVENous Once Harsh Pinto MD        metronidazole (FLAGYL) 500 mg in 0.9% NaCl 100 mL IVPB premix  500 mg IntraVENous Once Harsh Pinto MD        lidocaine PF 1 % injection 1 mL  1 mL IntraDERmal Once PRN Sarah Garcia MD        0.9 % sodium chloride infusion   IntraVENous Continuous Sarah Garcia  mL/hr at 11/11/22 1159 New Bag at 11/11/22 1159    sodium chloride flush 0.9 % injection 5-40 mL  5-40 mL IntraVENous 2 times per day Sarah Garcia MD        sodium chloride flush 0.9 % injection 5-40 mL  5-40 mL IntraVENous PRN Sarah Garcia MD        0.9 % sodium chloride infusion   IntraVENous PRN Sarah Garcia MD        scopolamine (TRANSDERM-SCOP) transdermal patch 1 patch  1 patch TransDERmal Q72H Ivan Meade MD   1 patch at 11/11/22 1146       Allergies:  No Known Allergies    Problem List:    Patient Active Problem List   Diagnosis Code    Hyperlipemia E78.5    Hypertension I10    Osteoarthritis of right knee M17.11    Diabetes mellitus due to underlying condition, uncontrolled, with diabetic neuropathy, without long-term current use of insulin [id number]    Obesity (BMI 35.0-39.9 without comorbidity) E66.9    Microalbuminuria R80.9    Periodontal abscess A43.612       Past Medical History:        Diagnosis Date    Anemia     low iron    Cataract     Depression     Detached retina 2001    Right eye. No vision upper half.  Diabetes mellitus due to underlying condition, uncontrolled, with diabetic neuropathy, without long-term current use of insulin 06/21/2016    Diverticulitis     Hyperlipidemia     Hypertension     Microalbuminuria 08/14/2018    MVP (mitral valve prolapse)     Obesity (BMI 35.0-39.9 without comorbidity) 07/01/2016    Partial old retinal detachment     Shingles 09/2016    right neck, no residual    Type II or unspecified type diabetes mellitus without mention of complication, not stated as uncontrolled        Past Surgical History:        Procedure Laterality Date    COLONOSCOPY W/ BIOPSIES  10/06/2022    WITH POLYPS; done at [address] [place] [address] Right 01/01/1983    ankle    LIPOMA RESECTION Left 01/01/2009    LUQ       Social History:    Social History     Tobacco Use    Smoking status: Never    Smokeless tobacco: Never   Substance Use Topics    Alcohol use: No     Alcohol/week: 0.0 standard drinks                                Counseling given: Not Answered      Vital Signs (Current):   Vitals:    11/11/22 1125   BP: 131/70   Pulse: 76   Resp: 16   Temp: 97.1 °F (36.2 °C)   TempSrc: Infrared   SpO2: 97%   Weight: 247 lb (112 kg)   Height: 5' 9.5\" (1.765 m)                                              BP Readings from Last 3 Encounters:   11/11/22 131/70   10/31/22 (!) 149/83   10/20/22 136/82       NPO Status: Time of last liquid consumption: 1930                        Time of last solid consumption: 2000                        Date of last liquid consumption: 11/10/22                        Date of last solid food consumption: 11/09/22    BMI:   Wt Readings from Last 3 Encounters:   11/11/22 247 lb (112 kg)   10/31/22 247 lb (112 kg)   10/20/22 246 lb (111.6 kg)     Body mass index is 35.95 kg/m².     CBC: Lab Results   Component Value Date/Time    WBC 7.0 10/31/2022 01:45 PM    RBC 4.14 10/31/2022 01:45 PM    HGB 10.9 10/31/2022 01:45 PM    HCT 33.6 10/31/2022 01:45 PM    MCV 81.1 10/31/2022 01:45 PM    RDW 15.4 10/31/2022 01:45 PM     10/31/2022 01:45 PM       CMP:   Lab Results   Component Value Date/Time     10/31/2022 01:45 PM    K 4.9 10/31/2022 01:45 PM     10/31/2022 01:45 PM    CO2 27 10/31/2022 01:45 PM    BUN 11 10/31/2022 01:45 PM    CREATININE 0.49 10/31/2022 01:45 PM    GFRAA >60 08/21/2019 05:44 PM    LABGLOM >60 10/31/2022 01:45 PM    GLUCOSE 119 10/31/2022 01:45 PM    PROT 7.7 10/31/2022 01:45 PM    CALCIUM 9.9 10/31/2022 01:45 PM    BILITOT 0.3 10/31/2022 01:45 PM    ALKPHOS 180 10/31/2022 01:45 PM    AST 20 10/31/2022 01:45 PM    ALT 17 10/31/2022 01:45 PM       POC Tests:   Recent Labs     11/11/22  1139   POCGLU 139*       Coags: No results found for: PROTIME, INR, APTT    HCG (If Applicable): No results found for: PREGTESTUR, PREGSERUM, HCG, HCGQUANT     ABGs: No results found for: PHART, PO2ART, MVD6PKV, AMF5YTP, BEART, O7RXWQZR     Type & Screen (If Applicable):  No results found for: LABABO, LABRH    Drug/Infectious Status (If Applicable):  No results found for: HIV, HEPCAB    COVID-19 Screening (If Applicable): No results found for: COVID19        Anesthesia Evaluation  Patient summary reviewed and Nursing notes reviewed no history of anesthetic complications:   Airway: Mallampati: III  TM distance: >3 FB   Neck ROM: full  Mouth opening: > = 3 FB   Dental:      Comment:  Many missing teeth    Pulmonary:Negative Pulmonary ROS and normal exam  breath sounds clear to auscultation                             Cardiovascular:  Exercise tolerance: good (>4 METS),   (+) hypertension:, hyperlipidemia        Rhythm: regular  Rate: normal                    Neuro/Psych:   (+) psychiatric history: stable with treatmentdepression/anxiety             GI/Hepatic/Renal:   (+) bowel prep, ROS comment: Diverticulitis  Colon polyps. Endo/Other:    (+) DiabetesType II DM, using insulin, blood dyscrasia: anemia:., .                 Abdominal:   (+) obese,           Vascular: Other Findings:           Anesthesia Plan      general     ASA 2       Induction: intravenous. MIPS: Postoperative opioids intended and Prophylactic antiemetics administered. Anesthetic plan and risks discussed with patient. Plan discussed with CRNA.                     Maris Moran MD   11/11/2022

## 2022-11-11 NOTE — INTERVAL H&P NOTE
Update History & Physical    The patient's History and Physical of October 31, 2022 was reviewed with the patient and I examined the patient. There was no change. The surgical site was confirmed by the patient and me. Pt undergoing for  OPEN SIGMOID COLECTOMY per Dr Alessandro Mathis   Pt denies fever/chills, chest pain or SOB  Pt NPO since the past midnight , pt took all her am medication today with sip of water  Denies taking any blood thinner medication   Dimitrios hx of blood clots in the lungs/legs  Denies hx MRSA infection   denies any personal or family problems with anesthesia  Physical exam remains unchanged including cardiac and pulmonary assessment   See nursing flow sheet for vital sings    Lab Results   Component Value Date    WBC 7.0 10/31/2022    HGB 10.9 (L) 10/31/2022    HCT 33.6 (L) 10/31/2022    MCV 81.1 10/31/2022     10/31/2022     Lab Results   Component Value Date/Time     10/31/2022 01:45 PM    K 4.9 10/31/2022 01:45 PM     10/31/2022 01:45 PM    CO2 27 10/31/2022 01:45 PM    BUN 11 10/31/2022 01:45 PM    CREATININE 0.49 10/31/2022 01:45 PM    GLUCOSE 119 10/31/2022 01:45 PM    CALCIUM 9.9 10/31/2022 01:45 PM          Plan: The risks, benefits, expected outcome, and alternative to the recommended procedure have been discussed with the patient. Patient understands and wants to proceed with the procedure.      Electronically signed by SINDY Bartholomew CNP on 11/11/2022 at 11:01 AM

## 2022-11-16 LAB — SURGICAL PATHOLOGY REPORT: NORMAL

## 2023-02-27 ENCOUNTER — TELEPHONE (OUTPATIENT)
Dept: GASTROENTEROLOGY | Age: 53
End: 2023-02-27

## 2023-02-27 NOTE — TELEPHONE ENCOUNTER
Patient lvm to schedule office visit. Writer called patient back she stated that she had been trying to get in touch with our office for the past 2 months. Pt stated she had a referral sent over from Dr Anna Juilan office Mitul Webster stated that we haven't received any referrals and she would have to call his office for them to fax it over. Patient stated that she was at work and couldn't get his number at the time writer stated that I didn't know his office number. Patient then asked for my name I gave it to her she then started yelling over the phone writer then stated that the call was being disconnected and told patient to have a good day.

## 2023-02-28 NOTE — TELEPHONE ENCOUNTER
Rec'd referral for pt to schedule appt. Tried calling pt and her vm is full. Could not leave a message.

## 2023-03-14 ENCOUNTER — OFFICE VISIT (OUTPATIENT)
Dept: GASTROENTEROLOGY | Age: 53
End: 2023-03-14
Payer: MEDICAID

## 2023-03-14 VITALS
BODY MASS INDEX: 35.36 KG/M2 | DIASTOLIC BLOOD PRESSURE: 81 MMHG | WEIGHT: 247 LBS | HEIGHT: 70 IN | SYSTOLIC BLOOD PRESSURE: 136 MMHG

## 2023-03-14 DIAGNOSIS — E66.9 OBESITY, UNSPECIFIED CLASSIFICATION, UNSPECIFIED OBESITY TYPE, UNSPECIFIED WHETHER SERIOUS COMORBIDITY PRESENT: ICD-10-CM

## 2023-03-14 DIAGNOSIS — K76.9 LIVER DISEASE, CHRONIC: Primary | ICD-10-CM

## 2023-03-14 DIAGNOSIS — E78.1 HYPERTRIGLYCERIDEMIA: ICD-10-CM

## 2023-03-14 PROCEDURE — 3075F SYST BP GE 130 - 139MM HG: CPT | Performed by: INTERNAL MEDICINE

## 2023-03-14 PROCEDURE — 99205 OFFICE O/P NEW HI 60 MIN: CPT | Performed by: INTERNAL MEDICINE

## 2023-03-14 PROCEDURE — 3079F DIAST BP 80-89 MM HG: CPT | Performed by: INTERNAL MEDICINE

## 2023-03-14 ASSESSMENT — ENCOUNTER SYMPTOMS
RESPIRATORY NEGATIVE: 1
ALLERGIC/IMMUNOLOGIC NEGATIVE: 1
GASTROINTESTINAL NEGATIVE: 1

## 2023-03-14 NOTE — PROGRESS NOTES
Reason for Referral:   Mario Tobias MD  [address]  [place] [address]    Chief Complaint   Patient presents with    Other     Np ref for buitrago. Pt states she rarely ever drinks. States she doesn't know her fam hx due to being adopted. States she has a lot of itchiness.        1. Liver disease, chronic    2. Hypertriglyceridemia    3. Obesity, unspecified classification, unspecified obesity type, unspecified whether serious comorbidity present            HISTORY OF PRESENT ILLNESS:   Patient seen with a history of chronic liver disease.    This patient had a CT scan done in August 2022 that revealed signs of cirrhosis.  Patient denies history of liver disease in the past.  No history of jaundice.  No prior history of ascites encephalopathy etc.  No family history of known liver diseases.  Patient does not have risk factors for viral hepatitis.  She used to drink alcohol in the past but stopped several years ago.    Patient had diabetes in the last 25 years and blood sugars are not well controlled.  Recently her hemoglobin is 8.9 few months ago it was up to 10.  Patient has some weight gain in the last year.    Our records reviewed.  She had a colonoscopy done 11/11/2022 and had a polypoid lesion removed, histology revealed juvenile polyp.    Also her triglycerides are elevated up to 543.  Hemoglobin is about 8.9.  Patient has sedentary lifestyle.            Past Medical,Family, and Social History reviewed and does contribute to the patient presentingcondition.    Patient's PMH/PSH,SH,PSYCH Hx, MEDs, ALLERGIES, and ROS were all reviewed and updated in the appropriate sections.    PAST MEDICAL HISTORY:  Past Medical History:   Diagnosis Date    Anemia     low iron    Cataract     Depression     Detached retina 2001    Right eye. No vision upper half.    Diabetes mellitus due to underlying condition, uncontrolled, with diabetic neuropathy, without long-term current use of insulin 06/21/2016     Diverticulitis     Hyperlipidemia     Hypertension     Microalbuminuria 08/14/2018    MVP (mitral valve prolapse)     Obesity (BMI 35.0-39.9 without comorbidity) 07/01/2016    Partial old retinal detachment     Shingles 09/2016    right neck, no residual    Type II or unspecified type diabetes mellitus without mention of complication, not stated as uncontrolled        Past Surgical History:   Procedure Laterality Date    COLONOSCOPY N/A 11/11/2022    COLONOSCOPY SNARE POLYPECTOMY HOT W/ CLIP APPLICATION X3 performed by Matt Dahl MD at [place] [address]  10/06/2022    WITH POLYPS; done at [facility] Right 01/01/1983    ankle    LIPOMA RESECTION Left 01/01/2009    LUQ       CURRENT MEDICATIONS:    Current Outpatient Medications:     NOVOLOG FLEXPEN 100 UNIT/ML injection pen, INJECT 12 UNITS SUBCUTANEOUSLY THREE TIMES A DAY BEFORE MEALS, Disp: 15 mL, Rfl: 0    rOPINIRole (REQUIP) 0.5 MG tablet, TAKE 1 TABLET BY MOUTH EVERY DAY AT NIGHT 30 MINUTES BEFORE BED, Disp: 30 tablet, Rfl: 0    sertraline (ZOLOFT) 100 MG tablet, TAKE 1 TABLET BY MOUTH EVERY DAY, Disp: 30 tablet, Rfl: 0    atorvastatin (LIPITOR) 20 MG tablet, TAKE 1 TABLET BY MOUTH EVERY DAY, Disp: 30 tablet, Rfl: 0    metoprolol tartrate (LOPRESSOR) 25 MG tablet, TAKE 1/2 TABLET BY MOUTH 2 TIMES A DAY, Disp: 30 tablet, Rfl: 0    metFORMIN (GLUCOPHAGE) 1000 MG tablet, TAKE 1 TABLET BY MOUTH TWO TIMES A DAY WITH MEALS, Disp: 60 tablet, Rfl: 0    LANTUS SOLOSTAR 100 UNIT/ML injection pen, INJECT 84 UNITS SUBCUTANEOUSLY 2 TIMES A DAY, Disp: 45 mL, Rfl: 0    glimepiride (AMARYL) 2 MG tablet, TAKE 1 TABLET BY MOUTH 2 TIMES A DAY, Disp: 60 tablet, Rfl: 0    ONETOUCH ULTRA strip, USE TO TEST BLOOD SUGAR 4 TIMES A DAY AS NEEDED FOR SYMPTOMS OF IRREGULAR BLOOD SUGAR, Disp: 150 each, Rfl: 0    losartan-hydroCHLOROthiazide (HYZAAR) 100-12.5 MG per tablet, TAKE 1 TABLET BY MOUTH EVERY DAY (Patient taking differently: at bedtime TAKE 1 TABLET BY MOUTH EVERY DAY), Disp: 30 tablet, Rfl: 0    Lancets (Gonzella Flash) MISC, Test Blood sugar up to 4 times daily, as needed, Disp: 150 each, Rfl: 5    Insulin Pen Needle (BD PEN NEEDLE EDWIN 2ND GEN) 32G X 4 MM MISC, USE AS DIRECTED TO INJECT DAILY, Disp: 150 each, Rfl: 5    glucose monitoring kit (FREESTYLE) monitoring kit, 1 kit by Does not apply route daily as needed (diabetes) One Touch Kit, Disp: 1 kit, Rfl: 0    LANTUS SOLOSTAR 100 UNIT/ML injection pen, INJECT 84 UNITS SUBCUTANEOUSLY 2 TIMES A DAY (Patient not taking: Reported on 3/14/2023), Disp: 45 mL, Rfl: 0    LANTUS SOLOSTAR 100 UNIT/ML injection pen, INJECT 84 UNITS SUBCUTANEOUSLY 2 TIMES A DAY (Patient not taking: Reported on 3/14/2023), Disp: 45 mL, Rfl: 0    nystatin (MYCOSTATIN) 142826 UNIT/GM powder, Apply topically 4 times daily.  (Patient not taking: Reported on 3/14/2023), Disp: 15 g, Rfl: 2    ALLERGIES:   No Known Allergies    FAMILY HISTORY:       Adopted: Yes         SOCIAL HISTORY:   Social History     Socioeconomic History    Marital status: Single     Spouse name: Not on file    Number of children: Not on file    Years of education: Not on file    Highest education level: Not on file   Occupational History    Occupation:      Comment: Night shift   Tobacco Use    Smoking status: Never    Smokeless tobacco: Never   Vaping Use    Vaping Use: Never used   Substance and Sexual Activity    Alcohol use: Not Currently    Drug use: No    Sexual activity: Not on file   Other Topics Concern    Not on file   Social History Narrative    Not on file     Social Determinants of Health     Financial Resource Strain: Medium Risk    Difficulty of Paying Living Expenses: Somewhat hard   Food Insecurity: No Food Insecurity    Worried About Running Out of Food in the Last Year: Never true    Ran Out of Food in the Last Year: Never true   Transportation Needs: Not on file   Physical Activity: Not on file   Stress: Not on file   Social Connections: Not on file   Intimate Partner Violence: Not on file   Housing Stability: Not on file       REVIEW OF SYSTEMS:       Review of Systems   Constitutional: Negative. HENT: Negative. Eyes:  Positive for visual disturbance (glasses). Respiratory: Negative. Cardiovascular: Negative. Gastrointestinal: Negative. Endocrine: Negative. Genitourinary: Negative. Musculoskeletal: Negative. Skin: Negative. Allergic/Immunologic: Negative. Neurological: Negative. Hematological: Negative. Psychiatric/Behavioral: Negative. PHYSICAL EXAMINATION: Vital signs reviewed per the nursing documentation. /81   Ht 5' 9.5\" (1.765 m)   Wt 247 lb (112 kg)   BMI 35.95 kg/m²   Body mass index is 35.95 kg/m². Physical Exam  Vitals and nursing note reviewed. Constitutional:       Appearance: She is well-developed. Comments: Moderately overweight patient. HENT:      Head: Normocephalic and atraumatic. Eyes:      General: No scleral icterus. Conjunctiva/sclera: Conjunctivae normal.      Pupils: Pupils are equal, round, and reactive to light. Neck:      Thyroid: No thyromegaly. Vascular: No hepatojugular reflux or JVD. Trachea: No tracheal deviation. Cardiovascular:      Rate and Rhythm: Normal rate and regular rhythm. Heart sounds: Normal heart sounds. Pulmonary:      Effort: Pulmonary effort is normal. No respiratory distress. Breath sounds: Normal breath sounds. No wheezing or rales. Abdominal:      General: Bowel sounds are normal. There is no distension. Palpations: Abdomen is soft. There is no hepatomegaly or mass. Tenderness: There is no abdominal tenderness. There is no rebound. Hernia: No hernia is present. Comments: Obese abdomen difficult to examine.   However patient has hepatomegaly and palpable about 2 inches below right costal margin, surface appears to be nodular, nontender. Sagging of pelvic abdominal wall   Musculoskeletal:         General: No tenderness. Cervical back: Normal range of motion and neck supple. Comments: No joint swelling   Lymphadenopathy:      Cervical: No cervical adenopathy. Skin:     General: Skin is warm. Findings: No bruising, ecchymosis, erythema or rash. Neurological:      Mental Status: She is alert and oriented to person, place, and time. Cranial Nerves: No cranial nerve deficit. Psychiatric:         Thought Content: Thought content normal.         LABORATORY DATA: Reviewed  Lab Results   Component Value Date    WBC 7.0 10/31/2022    HGB 10.9 (L) 10/31/2022    HCT 33.6 (L) 10/31/2022    MCV 81.1 10/31/2022     10/31/2022     10/31/2022    K 4.9 10/31/2022     10/31/2022    CO2 27 10/31/2022    BUN 11 10/31/2022    CREATININE 0.49 (L) 10/31/2022    LABALBU 4.4 10/31/2022    BILITOT 0.3 10/31/2022    ALKPHOS 180 (H) 10/31/2022    AST 20 10/31/2022    ALT 17 10/31/2022         Lab Results   Component Value Date    RBC 4.14 10/31/2022    HGB 10.9 (L) 10/31/2022    MCV 81.1 10/31/2022    MCH 26.3 10/31/2022    MCHC 32.4 10/31/2022    RDW 15.4 (H) 10/31/2022    MPV 7.7 10/31/2022    BASOPCT 1 10/31/2022    LYMPHSABS 2.40 10/31/2022    MONOSABS 0.40 10/31/2022    NEUTROABS 4.10 10/31/2022    EOSABS 0.00 10/31/2022    BASOSABS 0.00 10/31/2022         DIAGNOSTIC TESTING:     No results found. IMPRESSION: Ms. Alison Dooley is a 50 y.o. female with    Assessment:  1. Liver disease, chronic    2. Hypertriglyceridemia    3. Obesity, unspecified classification, unspecified obesity type, unspecified whether serious comorbidity present        Plan:  Patient has hepatomegaly with a nodular surface. Given hyperglycemia, hyperlipidemia, sedentary lifestyle I think that she has a metabolic syndrome leading to fatty liver. Suspect that she has Ross Downey related liver disease/cirrhosis.   Need to evaluate other etiologies for chronic liver diseases. Also needs ultrasound of the liver. Patient is strongly encouraged to restrict calories, daily exercise, active lifestyle and to lose weight. To see primary care physician regarding controlling hemoglobin A1c to about 6.5. Also she may need further treatment regarding hypertriglyceridemia. After discussion patient understood and will make an appointment with the PCP soon. Advised to see me in the next 6 weeks. Basing on the results of this investigation will make further recommendations. Spent 30 minutes providing patient education and counseling. Thank you for allowing me to participate in the care of Ms. Behlmer. For any further questions please do not hesitate to contact me. Note is dictated utilizing voice recognition software. Unfortunately this leads to occasional typographical errors. Please contact our office if you have any questions. I have reviewed and agree with the MA/SHIRLEYN ROS.      Leobardo Orellana MD, [facility]  Board Certified in Gastroenterology and [address] [place] [address] Gastroenterology  Office #: [phone]

## 2023-03-16 ENCOUNTER — HOSPITAL ENCOUNTER (OUTPATIENT)
Dept: ULTRASOUND IMAGING | Age: 53
Discharge: HOME | End: 2023-03-18
Payer: MEDICAID

## 2023-03-16 ENCOUNTER — HOSPITAL ENCOUNTER (OUTPATIENT)
Age: 53
Discharge: HOME | End: 2023-03-16
Payer: MEDICAID

## 2023-03-16 DIAGNOSIS — K76.9 LIVER LESION: Primary | ICD-10-CM

## 2023-03-16 DIAGNOSIS — K76.9 LIVER DISEASE, CHRONIC: ICD-10-CM

## 2023-03-16 LAB
ABSOLUTE EOS #: 0.08 K/UL (ref 0–0.4)
ABSOLUTE LYMPH #: 1.55 K/UL (ref 1–4.8)
ABSOLUTE MONO #: 0.29 K/UL (ref 0.1–1.3)
ALANINE AMINOTRANSFERASE, FIBROMETER: NORMAL
ALBUMIN SERPL-MCNC: 4.4 G/DL (ref 3.5–5.2)
ALP SERPL-CCNC: 197 U/L (ref 35–104)
ALPHA-2-MACROGLOBULIN, FIBROMETER: NORMAL
ALT SERPL-CCNC: 3.1 UG/L
ALT SERPL-CCNC: 36 U/L (ref 5–33)
ASPARTATE AMINOTRANSFERASE, FIBROMETER: NORMAL
AST SERPL-CCNC: 63 U/L
BASOPHILS # BLD: 1 % (ref 0–2)
BASOPHILS ABSOLUTE: 0.04 K/UL (ref 0–0.2)
BILIRUB DIRECT SERPL-MCNC: 0.2 MG/DL
BILIRUB INDIRECT SERPL-MCNC: 0.3 MG/DL (ref 0–1)
BILIRUB SERPL-MCNC: 0.5 MG/DL (ref 0.3–1.2)
CERULOPLASMIN SERPL-MCNC: 30 MG/DL (ref 16–45)
CIRRHOMETER PATIENT SCORE: NORMAL
EER FIBROMETER REPORT: NORMAL
EOSINOPHILS RELATIVE PERCENT: 2 % (ref 0–4)
FIBROMETER INTERPRETATION: NORMAL
FIBROMETER PATIENT SCORE: NORMAL
FIBROMETER PLATELET COUNT: 198
FIBROMETER PROTHROMBIN INDEX: NORMAL
FIBROSIS METAVIR CLASSIFICATION: NORMAL
GAMMA GLUTAMYL TRANSFERASE, FIBROMETER: NORMAL
HAV IGM SER QL: NONREACTIVE
HBV CORE IGM SER QL: NONREACTIVE
HBV SURFACE AG SER QL: NONREACTIVE
HCT VFR BLD AUTO: 35 % (ref 36–46)
HCV AB SER QL: NONREACTIVE
HGB BLD-MCNC: 11.4 G/DL (ref 12–16)
INFLAMETER METAVIR CLASSIFICATION: NORMAL
INFLAMETER PATIENT SCORE: NORMAL
LYMPHOCYTES # BLD: 37 % (ref 24–44)
MCH RBC QN AUTO: 25.8 PG (ref 26–34)
MCHC RBC AUTO-ENTMCNC: 32.6 G/DL (ref 31–37)
MCV RBC AUTO: 79.2 FL (ref 80–100)
MONOCYTES # BLD: 7 % (ref 1–7)
MORPHOLOGY: ABNORMAL
PDW BLD-RTO: 16.9 % (ref 11.5–14.9)
PLATELET # BLD AUTO: 198 K/UL (ref 150–450)
PMV BLD AUTO: 8.4 FL (ref 6–12)
PROT SERPL-MCNC: 7.9 G/DL (ref 6.4–8.3)
RBC # BLD: 4.42 M/UL (ref 4–5.2)
SEG NEUTROPHILS: 53 % (ref 36–66)
SEGMENTED NEUTROPHILS ABSOLUTE COUNT: 2.24 K/UL (ref 1.3–9.1)
TRANSFERRIN SERPL-MCNC: 306 MG/DL (ref 200–360)
UREA NITROGEN, FIBROMETER: NORMAL
WBC # BLD AUTO: 4.2 K/UL (ref 3.5–11)

## 2023-03-16 PROCEDURE — 82103 ALPHA-1-ANTITRYPSIN TOTAL: CPT

## 2023-03-16 PROCEDURE — 82977 ASSAY OF GGT: CPT

## 2023-03-16 PROCEDURE — 84450 TRANSFERASE (AST) (SGOT): CPT

## 2023-03-16 PROCEDURE — 86225 DNA ANTIBODY NATIVE: CPT

## 2023-03-16 PROCEDURE — 83883 ASSAY NEPHELOMETRY NOT SPEC: CPT

## 2023-03-16 PROCEDURE — 82105 ALPHA-FETOPROTEIN SERUM: CPT

## 2023-03-16 PROCEDURE — 80076 HEPATIC FUNCTION PANEL: CPT

## 2023-03-16 PROCEDURE — 84520 ASSAY OF UREA NITROGEN: CPT

## 2023-03-16 PROCEDURE — 86038 ANTINUCLEAR ANTIBODIES: CPT

## 2023-03-16 PROCEDURE — 84460 ALANINE AMINO (ALT) (SGPT): CPT

## 2023-03-16 PROCEDURE — 85025 COMPLETE CBC W/AUTO DIFF WBC: CPT

## 2023-03-16 PROCEDURE — 83516 IMMUNOASSAY NONANTIBODY: CPT

## 2023-03-16 PROCEDURE — 82104 ALPHA-1-ANTITRYPSIN PHENO: CPT

## 2023-03-16 PROCEDURE — 76705 ECHO EXAM OF ABDOMEN: CPT

## 2023-03-16 PROCEDURE — 36415 COLL VENOUS BLD VENIPUNCTURE: CPT

## 2023-03-16 PROCEDURE — 82390 ASSAY OF CERULOPLASMIN: CPT

## 2023-03-16 PROCEDURE — 80074 ACUTE HEPATITIS PANEL: CPT

## 2023-03-16 PROCEDURE — 84466 ASSAY OF TRANSFERRIN: CPT

## 2023-03-16 NOTE — RESULT ENCOUNTER NOTE
Patient needs MRI to further evaluate the liver. Order placed. Please advise.     Electronically signed by SINDY Castillo NP on 3/16/2023 at 12:55 PM

## 2023-03-17 ENCOUNTER — TELEPHONE (OUTPATIENT)
Dept: GASTROENTEROLOGY | Age: 53
End: 2023-03-17

## 2023-03-18 LAB
ANA SER QL IA: NEGATIVE
DSDNA IGG SER QL IA: 0.9 IU/ML
MITOCHONDRIAL ANTIBODY: 2.2 U/ML (ref 0–4)
NUCLEAR IGG SER IA-RTO: 0.3 U/ML

## 2023-03-20 LAB
ALPHA-1 ANTITRYPSIN PHENOTYPE: NORMAL
ALPHA-1 ANTITRYPSIN: 136 MG/DL (ref 90–200)

## 2023-03-21 LAB
ALANINE AMINOTRANSFERASE, FIBROMETER: 45 U/L (ref 5–40)
ALPHA-2-MACROGLOBULIN, FIBROMETER: 216 MG/DL (ref 131–293)
ASPARTATE AMINOTRANSFERASE, FIBROMETER: 72 U/L (ref 9–40)
CIRRHOMETER PATIENT SCORE: 0.22
EER FIBROMETER REPORT: ABNORMAL
FIBROMETER INTERPRETATION: ABNORMAL
FIBROMETER PATIENT SCORE: 0.85
FIBROMETER PLATELET COUNT: 198
FIBROMETER PROTHROMBIN INDEX: 86 % (ref 90–120)
FIBROSIS METAVIR CLASSIFICATION: ABNORMAL
GAMMA GLUTAMYL TRANSFERASE, FIBROMETER: 796 U/L (ref 7–33)
INFLAMETER METAVIR CLASSIFICATION: ABNORMAL
INFLAMETER PATIENT SCORE: 0.42
UREA NITROGEN, FIBROMETER: 13 MG/DL (ref 7–20)

## 2023-03-22 ENCOUNTER — HOSPITAL ENCOUNTER (OUTPATIENT)
Age: 53
Setting detail: SPECIMEN
Discharge: HOME | End: 2023-03-22

## 2023-03-22 ENCOUNTER — HOSPITAL ENCOUNTER (OUTPATIENT)
Dept: MRI IMAGING | Facility: CLINIC | Age: 53
Discharge: HOME | End: 2023-03-24
Payer: MEDICAID

## 2023-03-22 DIAGNOSIS — K76.9 LIVER LESION: ICD-10-CM

## 2023-03-22 LAB — POC CREATININE: 0.7 MG/DL (ref 0.6–1.4)

## 2023-03-22 PROCEDURE — A9579 GAD-BASE MR CONTRAST NOS,1ML: HCPCS | Performed by: NURSE PRACTITIONER

## 2023-03-22 PROCEDURE — 74183 MRI ABD W/O CNTR FLWD CNTR: CPT

## 2023-03-22 PROCEDURE — 6360000004 HC RX CONTRAST MEDICATION: Performed by: NURSE PRACTITIONER

## 2023-03-22 PROCEDURE — 2580000003 HC RX 258: Performed by: NURSE PRACTITIONER

## 2023-03-22 RX ORDER — SODIUM CHLORIDE 0.9 % (FLUSH) 0.9 %
10 SYRINGE (ML) INJECTION PRN
Status: DISCONTINUED | OUTPATIENT
Start: 2023-03-22 | End: 2023-03-25 | Stop reason: HOSPADM

## 2023-03-22 RX ADMIN — SODIUM CHLORIDE, PRESERVATIVE FREE 10 ML: 5 INJECTION INTRAVENOUS at 11:42

## 2023-03-22 RX ADMIN — GADOTERIDOL 20 ML: 279.3 INJECTION, SOLUTION INTRAVENOUS at 11:41

## 2023-06-08 ENCOUNTER — OFFICE VISIT (OUTPATIENT)
Dept: GASTROENTEROLOGY | Age: 53
End: 2023-06-08
Payer: MEDICAID

## 2023-06-08 VITALS
BODY MASS INDEX: 36.92 KG/M2 | WEIGHT: 250 LBS | SYSTOLIC BLOOD PRESSURE: 148 MMHG | DIASTOLIC BLOOD PRESSURE: 83 MMHG | TEMPERATURE: 97.1 F | HEART RATE: 77 BPM

## 2023-06-08 DIAGNOSIS — E78.1 HYPERTRIGLYCERIDEMIA: ICD-10-CM

## 2023-06-08 DIAGNOSIS — K76.9 LIVER DISEASE, CHRONIC: ICD-10-CM

## 2023-06-08 DIAGNOSIS — K74.60 CIRRHOSIS OF LIVER WITHOUT ASCITES, UNSPECIFIED HEPATIC CIRRHOSIS TYPE (HCC): ICD-10-CM

## 2023-06-08 DIAGNOSIS — E66.9 OBESITY, UNSPECIFIED CLASSIFICATION, UNSPECIFIED OBESITY TYPE, UNSPECIFIED WHETHER SERIOUS COMORBIDITY PRESENT: Primary | ICD-10-CM

## 2023-06-08 PROCEDURE — 3077F SYST BP >= 140 MM HG: CPT | Performed by: NURSE PRACTITIONER

## 2023-06-08 PROCEDURE — 99214 OFFICE O/P EST MOD 30 MIN: CPT | Performed by: NURSE PRACTITIONER

## 2023-06-08 PROCEDURE — 3079F DIAST BP 80-89 MM HG: CPT | Performed by: NURSE PRACTITIONER

## 2023-06-08 ASSESSMENT — ENCOUNTER SYMPTOMS
WHEEZING: 0
TROUBLE SWALLOWING: 0
NAUSEA: 0
VOMITING: 0
CONSTIPATION: 0
ABDOMINAL PAIN: 0
CHOKING: 0
ANAL BLEEDING: 0
ABDOMINAL DISTENTION: 0
RECTAL PAIN: 0
DIARRHEA: 0
COUGH: 0
BLOOD IN STOOL: 0
SHORTNESS OF BREATH: 0

## 2023-06-08 NOTE — PROGRESS NOTES
GI CLINIC FOLLOW UP    INTERVAL HISTORY:   No referring provider defined for this encounter. Chief Complaint   Patient presents with    Hepatic Disease     Patient is f/u on HERNANDEZ. She states she is doing well. She was started on Ozempic, has lost weight and has been feeling really good. HISTORY OF PRESENT ILLNESS:     Patient being seen for follow-up HERNANDEZ, cirrhosis. Patient recently found to have cirrhosis of the liver. Her liver disease work-up has been unremarkable. Suspect HERNANDEZ etiology. AFP 3.1  F3, A1/A2    She had ultrasound of the liver that revealed cirrhosis morphology and overall hepatomegaly. Simple left hepatic lobe cyst with additional 12 mm rounded hypodensity, complex cyst versus mass. Subsequently she had MRI with hepatic mass protocol. Lesions in the reported left lobe of the liver on ultrasound were not visualized on the study. Does have a slightly heterogeneous mild T1 hyperintense 17 mm nodule in the posterior right lobe of the liver, appears benign, suspect hemangioma. Clinically she is doing well. She was started on Ozempic by her PCP. She has lost 17 pounds. Her blood sugars are much more controlled. Overall she feels great. No signs of decompensation. She had a colonoscopy last year with . No prior EGD    Denies any dysphagia, odynophagia, dyspeptic symptoms. Has fair appetite. Past Medical,Family, and Social History reviewed and does contribute to the patient presentingcondition. Patient's PMH/PSH,SH,PSYCH Hx, MEDs, ALLERGIES, and ROS were all reviewed and updated in the appropriate sections. PAST MEDICAL HISTORY:  Past Medical History:   Diagnosis Date    Anemia     low iron    Cataract     Depression     Detached retina 2001    Right eye. No vision upper half.     Diabetes mellitus due to underlying condition, uncontrolled, with diabetic neuropathy, without long-term current use of insulin 06/21/2016    Diverticulitis

## 2023-06-26 ENCOUNTER — ANESTHESIA EVENT (OUTPATIENT)
Dept: ENDOSCOPY | Age: 53
End: 2023-06-26
Payer: MEDICAID

## 2023-06-27 ENCOUNTER — ANESTHESIA (OUTPATIENT)
Dept: ENDOSCOPY | Age: 53
End: 2023-06-27
Payer: MEDICAID

## 2023-06-27 ENCOUNTER — HOSPITAL ENCOUNTER (OUTPATIENT)
Age: 53
Setting detail: OUTPATIENT SURGERY
Discharge: HOME | End: 2023-06-27
Attending: INTERNAL MEDICINE | Admitting: INTERNAL MEDICINE
Payer: MEDICAID

## 2023-06-27 VITALS
RESPIRATION RATE: 20 BRPM | DIASTOLIC BLOOD PRESSURE: 71 MMHG | TEMPERATURE: 97.3 F | HEART RATE: 70 BPM | OXYGEN SATURATION: 94 % | SYSTOLIC BLOOD PRESSURE: 122 MMHG | WEIGHT: 242 LBS | HEIGHT: 69 IN | BODY MASS INDEX: 35.84 KG/M2

## 2023-06-27 LAB — GLUCOSE BLD-MCNC: 95 MG/DL (ref 65–105)

## 2023-06-27 PROCEDURE — 43235 EGD DIAGNOSTIC BRUSH WASH: CPT | Performed by: INTERNAL MEDICINE

## 2023-06-27 PROCEDURE — 2580000003 HC RX 258: Performed by: ANESTHESIOLOGY

## 2023-06-27 PROCEDURE — 2500000003 HC RX 250 WO HCPCS: Performed by: ANESTHESIOLOGY

## 2023-06-27 PROCEDURE — 6360000002 HC RX W HCPCS: Performed by: NURSE ANESTHETIST, CERTIFIED REGISTERED

## 2023-06-27 PROCEDURE — 3609012400 HC EGD TRANSORAL BIOPSY SINGLE/MULTIPLE: Performed by: INTERNAL MEDICINE

## 2023-06-27 PROCEDURE — 7100000010 HC PHASE II RECOVERY - FIRST 15 MIN: Performed by: INTERNAL MEDICINE

## 2023-06-27 PROCEDURE — 2709999900 HC NON-CHARGEABLE SUPPLY: Performed by: INTERNAL MEDICINE

## 2023-06-27 PROCEDURE — 82947 ASSAY GLUCOSE BLOOD QUANT: CPT

## 2023-06-27 PROCEDURE — 3700000000 HC ANESTHESIA ATTENDED CARE: Performed by: INTERNAL MEDICINE

## 2023-06-27 PROCEDURE — 7100000011 HC PHASE II RECOVERY - ADDTL 15 MIN: Performed by: INTERNAL MEDICINE

## 2023-06-27 RX ORDER — SODIUM CHLORIDE 0.9 % (FLUSH) 0.9 %
5-40 SYRINGE (ML) INJECTION EVERY 12 HOURS SCHEDULED
Status: DISCONTINUED | OUTPATIENT
Start: 2023-06-27 | End: 2023-06-27 | Stop reason: HOSPADM

## 2023-06-27 RX ORDER — CYCLOBENZAPRINE HCL 10 MG
TABLET ORAL
COMMUNITY
Start: 2023-05-22

## 2023-06-27 RX ORDER — SODIUM CHLORIDE 9 MG/ML
INJECTION, SOLUTION INTRAVENOUS CONTINUOUS
Status: DISCONTINUED | OUTPATIENT
Start: 2023-06-27 | End: 2023-06-27 | Stop reason: HOSPADM

## 2023-06-27 RX ORDER — SODIUM CHLORIDE 0.9 % (FLUSH) 0.9 %
5-40 SYRINGE (ML) INJECTION PRN
Status: DISCONTINUED | OUTPATIENT
Start: 2023-06-27 | End: 2023-06-27 | Stop reason: HOSPADM

## 2023-06-27 RX ORDER — SODIUM CHLORIDE 9 MG/ML
INJECTION, SOLUTION INTRAVENOUS PRN
Status: DISCONTINUED | OUTPATIENT
Start: 2023-06-27 | End: 2023-06-27 | Stop reason: HOSPADM

## 2023-06-27 RX ORDER — PROPOFOL 10 MG/ML
INJECTION, EMULSION INTRAVENOUS PRN
Status: DISCONTINUED | OUTPATIENT
Start: 2023-06-27 | End: 2023-06-27 | Stop reason: SDUPTHER

## 2023-06-27 RX ORDER — LIDOCAINE HYDROCHLORIDE 10 MG/ML
1 INJECTION, SOLUTION EPIDURAL; INFILTRATION; INTRACAUDAL; PERINEURAL
Status: COMPLETED | OUTPATIENT
Start: 2023-06-27 | End: 2023-06-27

## 2023-06-27 RX ADMIN — LIDOCAINE HYDROCHLORIDE 1 ML: 10 INJECTION, SOLUTION EPIDURAL; INFILTRATION; INTRACAUDAL; PERINEURAL at 09:09

## 2023-06-27 RX ADMIN — PROPOFOL 150 MG: 10 INJECTION, EMULSION INTRAVENOUS at 10:50

## 2023-06-27 RX ADMIN — SODIUM CHLORIDE: 9 INJECTION, SOLUTION INTRAVENOUS at 09:10

## 2023-06-27 ASSESSMENT — ENCOUNTER SYMPTOMS
SORE THROAT: 0
WHEEZING: 0
BACK PAIN: 0
COUGH: 0
SHORTNESS OF BREATH: 0

## 2023-06-27 ASSESSMENT — PAIN - FUNCTIONAL ASSESSMENT: PAIN_FUNCTIONAL_ASSESSMENT: 0-10

## 2023-07-20 ENCOUNTER — OFFICE VISIT (OUTPATIENT)
Dept: GASTROENTEROLOGY | Age: 53
End: 2023-07-20
Payer: MEDICAID

## 2023-07-20 ENCOUNTER — TELEPHONE (OUTPATIENT)
Dept: GASTROENTEROLOGY | Age: 53
End: 2023-07-20

## 2023-07-20 VITALS
WEIGHT: 237.8 LBS | DIASTOLIC BLOOD PRESSURE: 73 MMHG | HEART RATE: 73 BPM | TEMPERATURE: 97.3 F | SYSTOLIC BLOOD PRESSURE: 134 MMHG | BODY MASS INDEX: 35.22 KG/M2 | HEIGHT: 69 IN

## 2023-07-20 DIAGNOSIS — K74.60 CIRRHOSIS OF LIVER WITHOUT ASCITES, UNSPECIFIED HEPATIC CIRRHOSIS TYPE (HCC): ICD-10-CM

## 2023-07-20 DIAGNOSIS — E66.9 OBESITY, UNSPECIFIED CLASSIFICATION, UNSPECIFIED OBESITY TYPE, UNSPECIFIED WHETHER SERIOUS COMORBIDITY PRESENT: ICD-10-CM

## 2023-07-20 DIAGNOSIS — E78.1 HYPERTRIGLYCERIDEMIA: ICD-10-CM

## 2023-07-20 DIAGNOSIS — K76.9 LIVER DISEASE, CHRONIC: Primary | ICD-10-CM

## 2023-07-20 PROCEDURE — 3075F SYST BP GE 130 - 139MM HG: CPT | Performed by: NURSE PRACTITIONER

## 2023-07-20 PROCEDURE — 99214 OFFICE O/P EST MOD 30 MIN: CPT | Performed by: NURSE PRACTITIONER

## 2023-07-20 PROCEDURE — 3078F DIAST BP <80 MM HG: CPT | Performed by: NURSE PRACTITIONER

## 2023-07-20 ASSESSMENT — ENCOUNTER SYMPTOMS
ANAL BLEEDING: 0
NAUSEA: 0
CONSTIPATION: 0
ABDOMINAL PAIN: 0
BLOOD IN STOOL: 0
SINUS PRESSURE: 0
SORE THROAT: 0
DIARRHEA: 0
COUGH: 0
CHOKING: 0
WHEEZING: 0
RECTAL PAIN: 0
TROUBLE SWALLOWING: 0
ABDOMINAL DISTENTION: 0
VOMITING: 0
BACK PAIN: 0
VOICE CHANGE: 0

## 2023-07-20 NOTE — TELEPHONE ENCOUNTER
Patient came into office for an OV with Brandy García follow up is recommended for Sept 2023 however patient is under [employer]. Writer provided patient information to change her insurance if she is electing to see provider(s) in our office. Patient stated she will switch and will contact our office once change is made.

## 2023-10-17 PROBLEM — Z78.0 POSTMENOPAUSAL: Status: ACTIVE | Noted: 2023-10-17

## 2023-10-23 ENCOUNTER — HOSPITAL ENCOUNTER (OUTPATIENT)
Dept: WOMENS IMAGING | Age: 53
Discharge: HOME | End: 2023-10-25
Payer: COMMERCIAL

## 2023-10-23 VITALS — BODY MASS INDEX: 31.55 KG/M2 | WEIGHT: 213 LBS | HEIGHT: 69 IN

## 2023-10-23 DIAGNOSIS — Z12.31 SCREENING MAMMOGRAM FOR BREAST CANCER: ICD-10-CM

## 2023-10-23 PROCEDURE — 77063 BREAST TOMOSYNTHESIS BI: CPT

## 2024-05-09 PROBLEM — F32.A DEPRESSION: Status: ACTIVE | Noted: 2024-05-09

## 2024-05-09 PROBLEM — E66.3 OVERWEIGHT WITH BODY MASS INDEX (BMI) OF 28 TO 28.9 IN ADULT: Status: ACTIVE | Noted: 2024-05-09

## 2025-01-03 ENCOUNTER — CLINICAL DOCUMENTATION (OUTPATIENT)
Dept: PHARMACY | Facility: CLINIC | Age: 55
End: 2025-01-03

## 2025-01-03 NOTE — PROGRESS NOTES
**Patient is BS**  Pharmacy Pop Care Documentation:   Patient has completed all the requirements and therefore will be enrolled in the DM Program.     Application received: via [employer] GEOVANNY.     Letter mailed to patient.     Selin Fox CphT  [facility]  Clinical Pharmacy   Department, toll free: [phone] Option #3    For Pharmacy Admin Tracking Only    Program: Pop Health  CPA in place:  No  Gap Closed?: Yes   Time Spent (min): 5

## 2025-01-09 ENCOUNTER — TELEPHONE (OUTPATIENT)
Dept: PHARMACY | Facility: CLINIC | Age: 55
End: 2025-01-09

## 2025-01-09 NOTE — TELEPHONE ENCOUNTER
**Patient is [facility]**  Called patient to schedule 2025 yearly pharmacist appointment to discuss medications for Diabetes Management Program.    No answer. Left VM on home TAD: Please call back at [phone] Option #3.     Pranav Moses [facility]  Clinical Pharmacy   Department, toll free: [phone] Option #3

## 2025-01-14 NOTE — TELEPHONE ENCOUNTER
Pt returned call and made an appt on   Tuesday Jan 21, 2025   Appt at 10:00 AM (30 min)     For Pharmacy Admin Tracking Only    Program: [employer]  CPA in place:  No  Recommendation Provided To: Patient/Caregiver: 1 via Telephone  Intervention Detail: Scheduled Appointment  Intervention Accepted By: Patient/Caregiver: 1  Gap Closed?: Yes   Time Spent (min): 5

## 2025-01-14 NOTE — TELEPHONE ENCOUNTER
Second attempt made to contact patient to schedule 2025 yearly pharmacist appointment to discuss medications for Diabetes Management Program.    No answer. Left VM on home/mobile TAD: Please call back at [phone] Option #3.     [employer] message sent to patient.    No further patient outreach planned at this time.    Pranav Moses [facility]  Clinical Pharmacy   Department, toll free: [phone] Option #3      For Pharmacy Admin Tracking Only    Program: [employer]  CPA in place:  No  Gap Closed?: No   Time Spent (min): 5

## 2025-01-14 NOTE — TELEPHONE ENCOUNTER
Noted.    Selin Fox [facility]   Population Health Clinical   [facility] Clinical Pharmacy  Department, toll free: [phone], option 3

## 2025-01-21 ENCOUNTER — TELEPHONE (OUTPATIENT)
Dept: PHARMACY | Facility: CLINIC | Age: 55
End: 2025-01-21

## 2025-01-21 NOTE — TELEPHONE ENCOUNTER
[facility] CLINICAL PHARMACY REVIEW - Be Well with Diabetes ([facility])    Susan K Behlmer is a 54 y.o. female enrolled in the [facility] Employee Diabetes Program. Patient provided writer with verbal consent to remain in the program for this year. Patient enrolled 1/1/25.    Medications:  Current Outpatient Medications   Medication Instructions    atorvastatin (LIPITOR) 20 mg, Oral, DAILY    blood glucose test strips (ONETOUCH ULTRA) strip As needed.    Insulin Pen Needle (BD PEN NEEDLE EDWIN 2ND GEN) 32G X 4 MM MISC USE AS DIRECTED TO INJECT DAILY    Lancets (ONETOUCH DELICA PLUS IGXCRJ32L) MISC Test Blood sugar up to 4 times daily    Lantus SoloStar 12 Units, SubCUTAneous, Nightly    losartan-hydroCHLOROthiazide (HYZAAR) 100-12.5 MG per tablet 1 tablet, Oral, NIGHTLY, at bedtime    metFORMIN (GLUCOPHAGE) 500 mg, Oral, DAILY WITH BREAKFAST, Taking half of one once a day    nystatin (MYCOSTATIN) 295288 UNIT/GM powder Apply 3 times daily.    OZEMPIC, 1 MG/DOSE, 4 MG/3ML SOPN sc injection INJECT 1 MG INTO THE SKIN EVERY SEVEN DAYS    sertraline (ZOLOFT) 200 mg, Oral, DAILY     Pharmacy and Supplies Information  Current Pharmacy: [facility] - Discussed [facility] options.  Sending information in [employer] and pt planning on requesting OV to get new orders sent.  HHP set a favorite in Epic    2025 Program Benefit  2025 Program Benefit- Be Well with Diabetes  [place] (HSA) benefits card  Contact Information: [phone]  Remember to call [facility] to give them your benefit card to pay for your copays.  Your benefit money will load within 4 to 6 weeks from enrollment date.  Amount:$450  There is only one card per family, the card is under the benefit ingram's name.  Funds can be used on anything health care related, and can roll over to next year if not used:  Eligible medical, prescription, dental and vision expenses for yourself and your tax dependents, even if they are not enrolled in our

## 2025-03-31 ENCOUNTER — CLINICAL DOCUMENTATION (OUTPATIENT)
Dept: PHARMACY | Facility: CLINIC | Age: 55
End: 2025-03-31

## 2025-03-31 NOTE — PROGRESS NOTES
[facility] Employee Diabetes Program - Be Well With Diabetes    Quarterly Check-in  Quarterly Reminder sent to patient for the DM Program - See Farida message or Letter for more information  Sent Far[facility]   Population Health Clinical   [facility] Clinical Pharmacy  Department, toll free: [phone], option 3    For Pharmacy Admin Tracking Only    Program: [employer]  CPA in place:  No  Gap Closed?: No   Time Spent (min): 5    =======================================================    Mychart/Letter for participant:    Thanks so much for taking the first step towards better health.    To ensure participants are taking steps to control their condition, ongoing requirements need to be completed. To receive the Be Well With Diabetes Program 2026, the following actions must be taken:     Requirements to be completed by 12/31/25  Visit with your physician (Second yearly visit)  Second A1C  Lipid panel (once yearly)  Flu vaccination (once yearly)  Taking a Statin, have a contraindication, or a Provider override  Taking an ACEi/ARB, have a contraindication, or a Provider override    Requirement due by 12/31/25 if A1C is greater than 8 percent:  Engage with a [facility] Associate Care Managers (ACM) or Clinical pharmacy specialists by phone at least 2 times, or complete some form of diabetes education through your provider, BeWell, etc.    *Documentation of any requirements completed or reviewed outside of the [facility] electronic medical record will need to be faxed or emailed (fax/email info below).*    **Note: If you complete a 2025 Be Well Within Screening you can have an A1C drawn at that time at no cost to you - Advise the Be Well Within Team at your screening that you are enrolled in the Be Well With Diabetes Program and you would like to have an A1C drawn with your Be Well Within Labs**    If

## 2025-08-13 ENCOUNTER — CLINICAL DOCUMENTATION (OUTPATIENT)
Dept: PHARMACY | Facility: CLINIC | Age: 55
End: 2025-08-13

## (undated) DEVICE — PREMIUM DRY TRAY LF: Brand: MEDLINE INDUSTRIES, INC.

## (undated) DEVICE — BITEBLOCK 54FR W/ DENT RIM BLOX

## (undated) DEVICE — YANKAUER,POOLE TIP,STERILE,50/CS: Brand: MEDLINE

## (undated) DEVICE — DRESSING TRNSPAR W4XL10IN FLM MIC POR SURESITE 123

## (undated) DEVICE — GLOVE ORANGE PI 7   MSG9070

## (undated) DEVICE — LEGGINGS, PAIR, 33X51 XL, STERILE: Brand: MEDLINE

## (undated) DEVICE — DRAPE,MEDI-SLUSH,STERILE: Brand: MEDLINE

## (undated) DEVICE — ST CHARLES MAJOR ABDOMINAL PK: Brand: MEDLINE INDUSTRIES, INC.

## (undated) DEVICE — SEALER ENDOSCP NANO COAT OPN DIV CRV L JAW LIGASURE IMPACT

## (undated) DEVICE — SOLUTION SCRB 4OZ 4% CHG H2O AIDED FOR PREOPERATIVE SKIN

## (undated) DEVICE — MERCY HEALTH ST CHARLES: Brand: MEDLINE INDUSTRIES, INC.

## (undated) DEVICE — CLIP LIG L235CM RESOL 360 BX/20

## (undated) DEVICE — GLOVE ORANGE PI 7 1/2   MSG9075

## (undated) DEVICE — RETRIEVER ENDOSCP L230CM DIA2.5MM NET W3XL5.5CM MIN WRK CHN

## (undated) DEVICE — SUTURE PERMAHAND SZ 0 L30IN NONABSORBABLE BLK FSL L30MM 3/8 680H

## (undated) DEVICE — PAD,NON-ADHERENT,3X8,STERILE,LF,1/PK: Brand: MEDLINE

## (undated) DEVICE — SOLUTION IRRIG 1000ML STRL H2O USP PLAS POUR BTL

## (undated) DEVICE — SINGLE PORT MANIFOLD: Brand: NEPTUNE 2

## (undated) DEVICE — ERBE NESSY® OMEGA PLATE USA (85+23)CM² , WITH CABLE 3 M: Brand: ERBE

## (undated) DEVICE — BLANKET WRM W29.9XL79.1IN UP BODY FORC AIR MISTRAL-AIR

## (undated) DEVICE — SYRINGE IRRIG 60ML SFT PLIABLE BLB EZ TO GRP 1 HND USE W/

## (undated) DEVICE — DEFENDO AIR WATER SUCTION AND BIOPSY VALVE KIT FOR  OLYMPUS: Brand: DEFENDO AIR/WATER/SUCTION AND BIOPSY VALVE

## (undated) DEVICE — ENDO KIT W/SYRINGE: Brand: MEDLINE INDUSTRIES, INC.

## (undated) DEVICE — SUTURE PDS II SZ 0 L60IN ABSRB VLT L48MM CTX 1/2 CIR Z990G

## (undated) DEVICE — COVER,MAYO STAND,XL,STERILE: Brand: MEDLINE

## (undated) DEVICE — SUTURE PDS + SZ 4 0 L27IN ABSRB VLT L26MM SH 1 2 CIR PDP315H

## (undated) DEVICE — GLOVE ORTHO 7 1/2   MSG9475

## (undated) DEVICE — Device

## (undated) DEVICE — SOLUTION IRRIG 1000ML 0.9% SOD CHL USP POUR PLAS BTL

## (undated) DEVICE — SHEET,DRAPE,UNDERBUTTOCK,GRAD POUCH,PORT: Brand: MEDLINE